# Patient Record
Sex: FEMALE | Race: WHITE | Employment: FULL TIME | URBAN - METROPOLITAN AREA
[De-identification: names, ages, dates, MRNs, and addresses within clinical notes are randomized per-mention and may not be internally consistent; named-entity substitution may affect disease eponyms.]

---

## 2022-02-11 DIAGNOSIS — M25.562 ACUTE PAIN OF LEFT KNEE: Primary | ICD-10-CM

## 2022-02-11 RX ORDER — DICLOFENAC SODIUM 50 MG/1
50 TABLET, DELAYED RELEASE ORAL 2 TIMES DAILY
Qty: 60 TABLET | Refills: 0 | Status: SHIPPED | OUTPATIENT
Start: 2022-02-11 | End: 2022-03-09

## 2022-02-24 NOTE — PROGRESS NOTES
Felicitas Hall (: 1985) is a 43 y.o. female, patient, here for evaluation of the following chief complaint(s):  Knee Pain (left)       HPI:    She began having increased left knee pain several months ago. The patient reports no specific injury. She describes her left knee pain as dull, throbbing, aching, and intermittent. She rates the severity of her left knee pain as a 6 out of 10. She has been experiencing some weakness in her left knee. The patient has been taking anti-inflammatory medication for discomfort as needed. The patient was seen at an urgent care facility and was given a steroid pack which she has since finished. They did perform x-rays at the urgent care facility and the radiograph report was independently reviewed today. She also reports 2 previous left knee injections. Left knee x-ray results from an outside facility were independently reviewed and they were consistent with advanced degenerative changes in the medial compartment with joint space loss which is moderate to severe. There is evidence of patellofemoral osteophyte formation as well as medial compartment osteophyte formation. Allergies   Allergen Reactions    Ceclor [Cefaclor] Rash     Tolerates amoxicillin/clavulante    Sulfa (Sulfonamide Antibiotics) Swelling       Current Outpatient Medications   Medication Sig    diclofenac EC (VOLTAREN) 50 mg EC tablet Take 1 Tablet by mouth two (2) times a day for 30 days.  phenobarb-hyoscy-atropine-scop (DONNATAL) 16.2 mg-0.1037 mg/5 mL (5 mL) elixir Take 5 mL by mouth every four (4) hours as needed for Cramping.  omeprazole (PRILOSEC) 40 mg capsule Take 40 mg by mouth Daily (before breakfast).  dicyclomine (BENTYL) 20 mg tablet Take 20 mg by mouth every six (6) hours as needed (esophageal spasms).  citalopram (CELEXA) 10 mg tablet Take 10 mg by mouth nightly.  medroxyPROGESTERone (DEPO-PROVERA) 150 mg/mL injection 150 mg by IntraMUSCular route.  Every 3 months     No current facility-administered medications for this visit. Past Medical History:   Diagnosis Date    Anxiety     Depression     PCOS (polycystic ovarian syndrome)         Past Surgical History:   Procedure Laterality Date    HX ORTHOPAEDIC      knee       History reviewed. No pertinent family history. Social History     Socioeconomic History    Marital status: SINGLE     Spouse name: Not on file    Number of children: Not on file    Years of education: Not on file    Highest education level: Not on file   Occupational History    Not on file   Tobacco Use    Smoking status: Never Smoker    Smokeless tobacco: Not on file   Substance and Sexual Activity    Alcohol use: No    Drug use: Not on file    Sexual activity: Not on file   Other Topics Concern    Not on file   Social History Narrative    Not on file     Social Determinants of Health     Financial Resource Strain:     Difficulty of Paying Living Expenses: Not on file   Food Insecurity:     Worried About Running Out of Food in the Last Year: Not on file    Windy of Food in the Last Year: Not on file   Transportation Needs:     Lack of Transportation (Medical): Not on file    Lack of Transportation (Non-Medical):  Not on file   Physical Activity:     Days of Exercise per Week: Not on file    Minutes of Exercise per Session: Not on file   Stress:     Feeling of Stress : Not on file   Social Connections:     Frequency of Communication with Friends and Family: Not on file    Frequency of Social Gatherings with Friends and Family: Not on file    Attends Sikh Services: Not on file    Active Member of Clubs or Organizations: Not on file    Attends Club or Organization Meetings: Not on file    Marital Status: Not on file   Intimate Partner Violence:     Fear of Current or Ex-Partner: Not on file    Emotionally Abused: Not on file    Physically Abused: Not on file    Sexually Abused: Not on file   Housing Stability:     Unable to Pay for Housing in the Last Year: Not on file    Number of Places Lived in the Last Year: Not on file    Unstable Housing in the Last Year: Not on file       Review of Systems   All other systems reviewed and are negative. Vitals:  Ht 5' 6\" (1.676 m)   BMI 46.00 kg/m²    Body mass index is 46 kg/m². Ortho Exam     The patient is well-developed and well-nourished. The patient presents today in alert and oriented x3 with a normal mood and affect. The patient stands with a normal weightbearing line but walks with a slightly antalgic gait because of her left knee pain. Left knee is tender to palpation along the medial joint line and has a small effusion. The patient has positive Breanna´s test and the knee is stable. There is a lack full flexion secondary to the effusion but does have full extension. There is 5/5. The knee and lower extremity is neurovascularly intact. There is no erythema or warmth the skin is normal.    ASSESSMENT/PLAN:      1. Chronic pain of left knee  2. Primary localized osteoarthritis of left knee  -     MRI KNEE LT WO CONT; Future  3. Osteophyte, left knee  4. Tear of medial meniscus of left knee, initial encounter  -     MRI KNEE LT WO CONT; Future       Below is the assessment and plan developed based on review of pertinent history, physical exam, labs, studies, and medications. We discussed the patient's left knee pain and her signs, symptoms, physical exam, description of her pain, and x-rays are consistent with medial compartment osteoarthritis, patellofemoral osteophyte formation, and a medial meniscus tear.   The possible treatment options were discussed with the patient and because of the several month long duration of her increased pain, no improvement with multiple modalities of conservative management including an at-home her physical exam, description of her pain, independently reviewed outside x-ray results, and her inability to complete daily living activities without significant discomfort we elected to obtain an MRI of her left knee to further evaluate the severity of her medial meniscus tear and osteoarthritis. The MRI images and results will be used in preoperative planning if and likely when surgical intervention is necessary. The risks and benefits of the MRI were discussed in detail with the patient and she would like to proceed. We will schedule this at her convenience. I will see her back after MRI is complete to discuss the images, results, and further treatment options. In the interim, I did encourage her to ice and elevate when possible, modify her activity level based on her left knee pain, and use anti-inflammatory medication when necessary. She will also work on range of motion, strengthening, and stretching exercises with an at-home exercise program as pain tolerates. She is to avoid any deep knee bend activities against resistance, squatting, kneeling, stairs, lunging, cutting, twisting, change of direction, and high impact loading activities. I will see her back as noted above after left knee MRI is complete. **We will obtain an MRI for more information to determine the best treatment plan moving forward and help us prepare for surgical intervention if necessary. **    Return in about 2 weeks (around 3/14/2022) for After her left knee MRI is complete. An electronic signature was used to authenticate this note.   -- Levi Perea MD

## 2022-02-28 ENCOUNTER — OFFICE VISIT (OUTPATIENT)
Dept: ORTHOPEDIC SURGERY | Age: 37
End: 2022-02-28
Payer: COMMERCIAL

## 2022-02-28 VITALS — BODY MASS INDEX: 46 KG/M2 | HEIGHT: 66 IN

## 2022-02-28 DIAGNOSIS — G89.29 CHRONIC PAIN OF LEFT KNEE: Primary | ICD-10-CM

## 2022-02-28 DIAGNOSIS — M25.562 CHRONIC PAIN OF LEFT KNEE: Primary | ICD-10-CM

## 2022-02-28 DIAGNOSIS — S83.242A TEAR OF MEDIAL MENISCUS OF LEFT KNEE, INITIAL ENCOUNTER: ICD-10-CM

## 2022-02-28 DIAGNOSIS — M25.762 OSTEOPHYTE, LEFT KNEE: ICD-10-CM

## 2022-02-28 DIAGNOSIS — M17.12 PRIMARY LOCALIZED OSTEOARTHRITIS OF LEFT KNEE: ICD-10-CM

## 2022-02-28 PROCEDURE — 99213 OFFICE O/P EST LOW 20 MIN: CPT | Performed by: ORTHOPAEDIC SURGERY

## 2022-03-06 DIAGNOSIS — M25.562 ACUTE PAIN OF LEFT KNEE: ICD-10-CM

## 2022-03-09 RX ORDER — DICLOFENAC SODIUM 50 MG/1
TABLET, DELAYED RELEASE ORAL
Qty: 60 TABLET | Refills: 0 | Status: SHIPPED | OUTPATIENT
Start: 2022-03-09 | End: 2022-03-10

## 2022-03-09 NOTE — PROGRESS NOTES
Tejinder Trejo (: 1985) is a 43 y.o. female, patient, here for evaluation of the following chief complaint(s):  Knee Pain (left knee)       HPI:    She was last seen for left knee pain on 2022. Since then, the patient did have an MRI performed on her left knee on 3/3/2022. The patient states that her pain level is the same as it was at her last visit. She rates the severity of her left knee pain as a 5 or 6 out of 10. She describes her pain as dull, throbbing, aching, and intermittent. Her left knee pain does make it difficult for her to go to sleep and does wake her up from sleep. The patient has been experiencing some weakness in her left knee. She has been taking naproxen for her discomfort as needed. She does report having an injection in her left knee previously. Left knee MRI images and results were independently reviewed and they were consistent with severe medial compartment osteoarthritis with mild surrounding edema. High-grade degenerative tearing in the body and posterior horn of the medial meniscus with partial meniscal extrusion. Bowing of the MCL with trace edema is related to this. Mild patellofemoral and lateral compartment osteoarthritis. Minimal joint effusion. Allergies   Allergen Reactions    Ceclor [Cefaclor] Rash     Tolerates amoxicillin/clavulante    Sulfa (Sulfonamide Antibiotics) Swelling       Current Outpatient Medications   Medication Sig    buPROPion XL (WELLBUTRIN XL) 150 mg tablet Take 150 mg by mouth daily.  escitalopram oxalate (LEXAPRO) 20 mg tablet     naproxen (NAPROSYN) 500 mg tablet naproxen 500 mg tablet    medroxyPROGESTERone (DEPO-PROVERA) 150 mg/mL syrg INJECT 150 MG BY INTRAMUSCULAR ROUTE EVERY 3 MONTHS FOR 90 DAYS     No current facility-administered medications for this visit.        Past Medical History:   Diagnosis Date    Anxiety     Depression     PCOS (polycystic ovarian syndrome)         Past Surgical History: Procedure Laterality Date    HX ORTHOPAEDIC      knee       History reviewed. No pertinent family history. Social History     Socioeconomic History    Marital status: SINGLE     Spouse name: Not on file    Number of children: Not on file    Years of education: Not on file    Highest education level: Not on file   Occupational History    Not on file   Tobacco Use    Smoking status: Never Smoker    Smokeless tobacco: Not on file   Substance and Sexual Activity    Alcohol use: No    Drug use: Not on file    Sexual activity: Not on file   Other Topics Concern    Not on file   Social History Narrative    Not on file     Social Determinants of Health     Financial Resource Strain:     Difficulty of Paying Living Expenses: Not on file   Food Insecurity:     Worried About Running Out of Food in the Last Year: Not on file    Windy of Food in the Last Year: Not on file   Transportation Needs:     Lack of Transportation (Medical): Not on file    Lack of Transportation (Non-Medical):  Not on file   Physical Activity:     Days of Exercise per Week: Not on file    Minutes of Exercise per Session: Not on file   Stress:     Feeling of Stress : Not on file   Social Connections:     Frequency of Communication with Friends and Family: Not on file    Frequency of Social Gatherings with Friends and Family: Not on file    Attends Holiness Services: Not on file    Active Member of [address] [place] [address] "[employer] or Organizations: Not on file    Attends Club or Organization Meetings: Not on file    Marital Status: Not on file   Intimate Partner Violence:     Fear of Current or Ex-Partner: Not on file    Emotionally Abused: Not on file    Physically Abused: Not on file    Sexually Abused: Not on file   Housing Stability:     Unable to Pay for Housing in the Last Year: Not on file    Number of [place] in the Last Year: Not on file    Unstable Housing in the Last Year: Not on file       Review of Systems   All other systems reviewed and are negative. Vitals:  Ht 5' 6\" (1.676 m)   Wt 285 lb (129.3 kg)   BMI 46.00 kg/m²    Body mass index is 46 kg/m². Ortho Exam     The patient is well-developed and well-nourished. The patient presents today in alert and oriented x3 with a normal mood and affect. The patient stands with a normal weightbearing line but walks with a slightly antalgic gait because of her left knee pain. Left knee is tender to palpation along the medial joint line and has a small effusion. She also has tenderness over her lateral and patellofemoral joint line. She does have increased discomfort with patella compression and quadriceps contraction with mild crepitus. The patient has positive Breanna´s test and the knee is stable. There is a lack full flexion secondary to the effusion but does have full extension. There is 5/5. The knee and lower extremity is neurovascularly intact. There is no erythema or warmth the skin is normal.    ASSESSMENT/PLAN:      1. Chronic pain of left knee  2. Medial knee pain, left  3. Knee meniscus pain, left  4. Primary localized osteoarthritis of left knee  5. Degenerative tear of left medial meniscus       Below is the assessment and plan developed based on review of pertinent history, physical exam, labs, studies, and medications. We discussed the patient's ongoing left knee pain and we independently reviewed her MRI images and results and they were consistent with severe medial compartment osteoarthritis with mild surrounding edema. High-grade degenerative tearing in the body and posterior horn of the medial meniscus with partial meniscal extrusion. Bowing of the MCL with trace edema is related to this. Mild patellofemoral and lateral compartment osteoarthritis. Minimal joint effusion.   The possible treatment options were discussed with the patient and because of the severity of her osteoarthritis we elected to send her to one of our total joint physicians to discuss the possibility and likelihood of an arthroplasty procedure. She will follow up with one of them in the near future and we will refer to her treatment plan moving forward. In the interim, I did encourage her to ice and elevate when possible, modify her activity level based on her ongoing left knee pain, and use anti-inflammatory medication when necessary. She will continue to work on range of motion, strengthening, and stretching exercises with an at-home exercise program as pain tolerates. She is to avoid any deep knee bend activities against resistance, squatting, kneeling, stairs, lunging, and high impact loading activities. I will see her back on an as-needed basis for left knee pain and as noted above refer to our total joint specialist treatment plan moving forward. Return for Follow-up with one of our total joint specialists. An electronic signature was used to authenticate this note.   -- Saira Galvan MD Quality 431: Preventive Care And Screening: Unhealthy Alcohol Use - Screening: Patient screened for unhealthy alcohol use using a single question and scores less than 2 times per year Quality 130: Documentation Of Current Medications In The Medical Record: Current Medications Documented Detail Level: Detailed Quality 110: Preventive Care And Screening: Influenza Immunization: Influenza Immunization Administered during Influenza season Quality 402: Tobacco Use And Help With Quitting Among Adolescents: Patient screened for tobacco and never smoked

## 2022-03-10 ENCOUNTER — OFFICE VISIT (OUTPATIENT)
Dept: ORTHOPEDIC SURGERY | Age: 37
End: 2022-03-10
Payer: COMMERCIAL

## 2022-03-10 VITALS — BODY MASS INDEX: 45.8 KG/M2 | HEIGHT: 66 IN | WEIGHT: 285 LBS

## 2022-03-10 DIAGNOSIS — G89.29 CHRONIC PAIN OF LEFT KNEE: Primary | ICD-10-CM

## 2022-03-10 DIAGNOSIS — M25.562 CHRONIC PAIN OF LEFT KNEE: Primary | ICD-10-CM

## 2022-03-10 DIAGNOSIS — M25.562 KNEE MENISCUS PAIN, LEFT: ICD-10-CM

## 2022-03-10 DIAGNOSIS — M17.12 PRIMARY LOCALIZED OSTEOARTHRITIS OF LEFT KNEE: ICD-10-CM

## 2022-03-10 DIAGNOSIS — M23.204 DEGENERATIVE TEAR OF LEFT MEDIAL MENISCUS: ICD-10-CM

## 2022-03-10 DIAGNOSIS — M25.562 MEDIAL KNEE PAIN, LEFT: ICD-10-CM

## 2022-03-10 PROCEDURE — 99213 OFFICE O/P EST LOW 20 MIN: CPT | Performed by: ORTHOPAEDIC SURGERY

## 2022-03-10 RX ORDER — MEDROXYPROGESTERONE ACETATE 150 MG/ML
INJECTION, SUSPENSION INTRAMUSCULAR
COMMUNITY
Start: 2022-02-08

## 2022-03-10 RX ORDER — ESCITALOPRAM OXALATE 20 MG/1
TABLET ORAL
COMMUNITY
Start: 2022-02-22

## 2022-03-10 RX ORDER — BUPROPION HYDROCHLORIDE 150 MG/1
150 TABLET ORAL DAILY
COMMUNITY
Start: 2022-02-13

## 2022-03-10 RX ORDER — NAPROXEN 500 MG/1
TABLET ORAL
COMMUNITY

## 2022-03-24 ENCOUNTER — OFFICE VISIT (OUTPATIENT)
Dept: ORTHOPEDIC SURGERY | Age: 37
End: 2022-03-24
Payer: COMMERCIAL

## 2022-03-24 VITALS — BODY MASS INDEX: 47.09 KG/M2 | WEIGHT: 293 LBS | HEIGHT: 66 IN

## 2022-03-24 DIAGNOSIS — E66.01 MORBID OBESITY (HCC): ICD-10-CM

## 2022-03-24 DIAGNOSIS — M25.562 LEFT KNEE PAIN, UNSPECIFIED CHRONICITY: ICD-10-CM

## 2022-03-24 DIAGNOSIS — G89.29 CHRONIC PAIN OF LEFT KNEE: Primary | ICD-10-CM

## 2022-03-24 DIAGNOSIS — M25.562 CHRONIC PAIN OF LEFT KNEE: Primary | ICD-10-CM

## 2022-03-24 DIAGNOSIS — M17.0 BILATERAL PRIMARY OSTEOARTHRITIS OF KNEE: ICD-10-CM

## 2022-03-24 PROCEDURE — 99213 OFFICE O/P EST LOW 20 MIN: CPT | Performed by: ORTHOPAEDIC SURGERY

## 2022-03-24 NOTE — PROGRESS NOTES
Marcus Bryna (: 1985) is a 43 y.o. female, patient, here for evaluation of the following chief complaint(s):  Knee Pain (left knee pain )       HPI:    Referral for left knee pain. Morbidly obese woman who is having significant decline in function from left knee osteoarthritis. Recent MRI. No recent x-rays. Patient states that she needs her knee replaced function. She cannot go on the way she has. Allergies   Allergen Reactions    Ceclor [Cefaclor] Rash     Tolerates amoxicillin/clavulante    Sulfa (Sulfonamide Antibiotics) Swelling       Current Outpatient Medications   Medication Sig    buPROPion XL (WELLBUTRIN XL) 150 mg tablet Take 150 mg by mouth daily.  escitalopram oxalate (LEXAPRO) 20 mg tablet     naproxen (NAPROSYN) 500 mg tablet naproxen 500 mg tablet    medroxyPROGESTERone (DEPO-PROVERA) 150 mg/mL syrg INJECT 150 MG BY INTRAMUSCULAR ROUTE EVERY 3 MONTHS FOR 90 DAYS     No current facility-administered medications for this visit. Past Medical History:   Diagnosis Date    Anxiety     Depression     PCOS (polycystic ovarian syndrome)         Past Surgical History:   Procedure Laterality Date    HX ORTHOPAEDIC      knee       History reviewed. No pertinent family history.      Social History     Socioeconomic History    Marital status: SINGLE     Spouse name: Not on file    Number of children: Not on file    Years of education: Not on file    Highest education level: Not on file   Occupational History    Not on file   Tobacco Use    Smoking status: Never Smoker    Smokeless tobacco: Never Used   Substance and Sexual Activity    Alcohol use: No    Drug use: Not on file    Sexual activity: Not on file   Other Topics Concern    Not on file   Social History Narrative    Not on file     Social Determinants of Health     Financial Resource Strain:     Difficulty of Paying Living Expenses: Not on file   Food Insecurity:     Worried About Running Out of Food in the Last Year: Not on file    Ran Out of Food in the Last Year: Not on file   Transportation Needs:     Lack of Transportation (Medical): Not on file    Lack of Transportation (Non-Medical): Not on file   Physical Activity:     Days of Exercise per Week: Not on file    Minutes of Exercise per Session: Not on file   Stress:     Feeling of Stress : Not on file   Social Connections:     Frequency of Communication with Friends and Family: Not on file    Frequency of Social Gatherings with Friends and Family: Not on file    Attends Restorationist Services: Not on file    Active Member of [address] [place] [address] [employer] or Organizations: Not on file    Attends Club or Organization Meetings: Not on file    Marital Status: Not on file   Intimate Partner Violence:     Fear of Current or Ex-Partner: Not on file    Emotionally Abused: Not on file    Physically Abused: Not on file    Sexually Abused: Not on file   Housing Stability:     Unable to Pay for Housing in the Last Year: Not on file    Number of [place] in the Last Year: Not on file    Unstable Housing in the Last Year: Not on file       ROS     Positive for: Musculoskeletal    Last edited by Chance Sadler on 3/24/2022  3:16 PM. (History)            Vitals:  Ht 5' 6\" (1.676 m)   Wt 295 lb (133.8 kg)   BMI 47.61 kg/m²    Body mass index is 47.61 kg/m². PHYSICAL EXAM:  Patient's knee has a varus overall alignment. Pain is present along the medial joint line. IMAGING:  XR Results (most recent):  Results from Appointment encounter on 03/24/22    XR KNEE LT 3 V    Narrative  X-rays left knee 3 views severe medial compartment bone-on-bone with medial compartment cysts and osteophytes. ASSESSMENT/PLAN:  1. Chronic pain of left knee  -     XR KNEE LT 3 V; Future  2. Bilateral primary osteoarthritis of knee  3. Left knee pain, unspecified chronicity  4. Morbid obesity ([address]    Morbid obesity is a clear-cut contraindication to total knee replacement.   It has been shown that patients with BMI below 40 have reduce risk of infection, blood clots, implant failure. This patient is young at 43years old. Total knee replacement is a treatment reserved for elderly patients. She has a relative contraindication to knee replacement which is BMI over 40. I discussed referral to bone spurs weight loss surgeons as well as referral to dietitians. The patient stated that she was not interested in that. She states that I want my knee replaced. I would recommend that she and her primary doctor seriously consider all options for weight loss. I am happy to help but she does not seem interested at all. She did not take the information regarding the weight loss program that we offered her. An electronic signature was used to authenticate this note.   --Ike Frazier MD

## 2022-03-24 NOTE — LETTER
3/24/2022    Patient: Leandro Cornejo   YOB: 1985   Date of Visit: 3/24/2022     [address] [phone]  Via Fax: [phone]    Dear Richard Ortez MD,      Thank you for referring Ms. Milad Foote to [facility] for evaluation. My notes for this consultation are attached. If you have questions, please do not hesitate to call me. I look forward to following your patient along with you.       Sincerely,    Rodrigo Barber MD

## 2022-04-03 DIAGNOSIS — M25.562 ACUTE PAIN OF LEFT KNEE: ICD-10-CM

## 2022-04-04 RX ORDER — DICLOFENAC SODIUM 50 MG/1
TABLET, DELAYED RELEASE ORAL
Qty: 60 TABLET | Refills: 0 | Status: SHIPPED | OUTPATIENT
Start: 2022-04-04 | End: 2022-05-05

## 2022-05-05 DIAGNOSIS — M25.562 ACUTE PAIN OF LEFT KNEE: ICD-10-CM

## 2022-05-05 RX ORDER — DICLOFENAC SODIUM 50 MG/1
TABLET, DELAYED RELEASE ORAL
Qty: 60 TABLET | Refills: 0 | Status: SHIPPED | OUTPATIENT
Start: 2022-05-05 | End: 2022-06-04

## 2022-06-03 DIAGNOSIS — M25.562 ACUTE PAIN OF LEFT KNEE: ICD-10-CM

## 2022-06-04 RX ORDER — DICLOFENAC SODIUM 50 MG/1
TABLET, DELAYED RELEASE ORAL
Qty: 60 TABLET | Refills: 0 | Status: SHIPPED | OUTPATIENT
Start: 2022-06-04 | End: 2022-07-08

## 2022-07-08 DIAGNOSIS — M25.562 ACUTE PAIN OF LEFT KNEE: ICD-10-CM

## 2022-07-08 RX ORDER — DICLOFENAC SODIUM 50 MG/1
TABLET, DELAYED RELEASE ORAL
Qty: 60 TABLET | Refills: 0 | Status: SHIPPED | OUTPATIENT
Start: 2022-07-08 | End: 2022-08-09

## 2022-08-09 DIAGNOSIS — M25.562 ACUTE PAIN OF LEFT KNEE: ICD-10-CM

## 2022-08-09 RX ORDER — DICLOFENAC SODIUM 50 MG/1
TABLET, DELAYED RELEASE ORAL
Qty: 60 TABLET | Refills: 0 | Status: SHIPPED | OUTPATIENT
Start: 2022-08-09 | End: 2022-09-12

## 2022-09-11 DIAGNOSIS — M25.562 ACUTE PAIN OF LEFT KNEE: ICD-10-CM

## 2022-09-12 RX ORDER — DICLOFENAC SODIUM 50 MG/1
TABLET, DELAYED RELEASE ORAL
Qty: 60 TABLET | Refills: 0 | Status: SHIPPED | OUTPATIENT
Start: 2022-09-12 | End: 2022-10-24

## 2022-10-20 DIAGNOSIS — M25.562 ACUTE PAIN OF LEFT KNEE: ICD-10-CM

## 2022-10-24 RX ORDER — DICLOFENAC SODIUM 50 MG/1
TABLET, DELAYED RELEASE ORAL
Qty: 60 TABLET | Refills: 0 | Status: SHIPPED | OUTPATIENT
Start: 2022-10-24 | End: 2022-11-21

## 2022-11-20 DIAGNOSIS — M25.562 ACUTE PAIN OF LEFT KNEE: ICD-10-CM

## 2022-11-21 RX ORDER — DICLOFENAC SODIUM 50 MG/1
TABLET, DELAYED RELEASE ORAL
Qty: 60 TABLET | Refills: 0 | Status: SHIPPED | OUTPATIENT
Start: 2022-11-21

## 2022-12-25 DIAGNOSIS — M25.562 ACUTE PAIN OF LEFT KNEE: ICD-10-CM

## 2022-12-27 RX ORDER — DICLOFENAC SODIUM 50 MG/1
TABLET, DELAYED RELEASE ORAL
Qty: 60 TABLET | Refills: 0 | Status: SHIPPED | OUTPATIENT
Start: 2022-12-27

## 2024-06-20 ENCOUNTER — OFFICE VISIT (OUTPATIENT)
Age: 39
End: 2024-06-20
Payer: COMMERCIAL

## 2024-06-20 VITALS
HEART RATE: 94 BPM | SYSTOLIC BLOOD PRESSURE: 100 MMHG | WEIGHT: 293 LBS | BODY MASS INDEX: 47.09 KG/M2 | OXYGEN SATURATION: 97 % | DIASTOLIC BLOOD PRESSURE: 70 MMHG | HEIGHT: 66 IN

## 2024-06-20 DIAGNOSIS — Z83.2 FAMILY HISTORY OF CLOTTING DISORDER: Primary | ICD-10-CM

## 2024-06-20 DIAGNOSIS — R06.09 DOE (DYSPNEA ON EXERTION): ICD-10-CM

## 2024-06-20 DIAGNOSIS — E66.9 OBESITY WITHOUT SERIOUS COMORBIDITY, UNSPECIFIED CLASSIFICATION, UNSPECIFIED OBESITY TYPE: ICD-10-CM

## 2024-06-20 DIAGNOSIS — Z82.49 FAMILY HISTORY OF HEART DISEASE: ICD-10-CM

## 2024-06-20 DIAGNOSIS — R07.9 CHEST PAIN, UNSPECIFIED TYPE: ICD-10-CM

## 2024-06-20 PROCEDURE — 99203 OFFICE O/P NEW LOW 30 MIN: CPT | Performed by: SPECIALIST

## 2024-06-20 PROCEDURE — 93000 ELECTROCARDIOGRAM COMPLETE: CPT | Performed by: SPECIALIST

## 2024-06-20 NOTE — PROGRESS NOTES
Batsheva Katz MD. [facility]          Patient: Lilia Villegas  : 1985      Today's Date: 2024        HISTORY OF PRESENT ILLNESS:     History of Present Illness:    Patient is presenting for evaluation given elevated risk for ASCVD. Has Factor V Leiden and was diagnosed age 17, father did too. Father had 8 MI's, several strokes, and  at age 61. Patient has not had any blood clots to her knowledge. Has history of orbital cellulitis and wonders if it could be related. Also has remote history of palpitations. Started about a year ago> Wore a Holter monitor but couldn't tolerate it because she was allergic to the adhesive. Started improving about 8 months when patient got a new job. Does have anxiety for which she takes Lexapro and Klonopin as needed. Has had multiple panic attacks and has had to go to the ER because she thought she was having a heart attack but work up was negative.      Patient does not have diabetes or hypertension. Says that her bad cholesterol was apparently high on her last labs she had drawn at [facility] six months. Was diagnosed with RA but is not being treated with any biologics or DMARDs. She does not smoke or regularly drink alcohol.     Patient also recently had a coronary CT scan and apparently had no calcium detected.     Patient's PCP is Dr. Albin Vergara at [facility].       PAST MEDICAL HISTORY:     Past Medical History:   Diagnosis Date    Anxiety     Depression     Panic attacks     PCOS (polycystic ovarian syndrome)        Past Surgical History:   Procedure Laterality Date    ORTHOPEDIC SURGERY      knee       CURRENT MEDICATIONS:    .  Current Outpatient Medications   Medication Sig Dispense Refill    buPROPion (WELLBUTRIN XL) 150 MG extended release tablet Take 1 tablet by mouth daily      diclofenac (VOLTAREN) 50 MG EC tablet TAKE 1 TABLET BY MOUTH TWICE A DAY      escitalopram (LEXAPRO) 20 MG tablet ceived the following from [facility]

## 2024-06-20 NOTE — PROGRESS NOTES
Chief Complaint   Patient presents with    New Patient     Vitals:    06/20/24 1529   BP: 100/70   Site: Left Upper Arm   Position: Sitting   Cuff Size: Medium Adult   Pulse: 94   SpO2: 97%   Weight: (!) 137 kg (302 lb)   Height: 1.676 m (5' 6\")      /70 (Site: Left Upper Arm, Position: Sitting, Cuff Size: Medium Adult)   Pulse 94   Ht 1.676 m (5' 6\")   Wt (!) 137 kg (302 lb)   SpO2 97%   BMI 48.74 kg/m²        Chief Complaint   Patient presents with    New Patient     Vitals:    06/20/24 1529   BP: 100/70   Site: Left Upper Arm   Position: Sitting   Cuff Size: Medium Adult   Pulse: 94   SpO2: 97%   Weight: (!) 137 kg (302 lb)   Height: 1.676 m (5' 6\")      /70 (Site: Left Upper Arm, Position: Sitting, Cuff Size: Medium Adult)   Pulse 94   Ht 1.676 m (5' 6\")   Wt (!) 137 kg (302 lb)   SpO2 97%   BMI 48.74 kg/m²

## 2024-06-20 NOTE — PROGRESS NOTES
Orders for Weight loss clinic info   Refer to hematology for FH clotting disorder     Treadmill stress test and echo     See me PRN  per Dr. Katz's VO.

## 2024-09-03 ENCOUNTER — TELEPHONE (OUTPATIENT)
Age: 39
End: 2024-09-03

## 2024-09-03 NOTE — TELEPHONE ENCOUNTER
Attempted to call patient to see where she was diagnosed with factor V so we can request records. No answer; left vm.

## 2024-09-09 ENCOUNTER — OFFICE VISIT (OUTPATIENT)
Age: 39
End: 2024-09-09
Payer: COMMERCIAL

## 2024-09-09 VITALS
BODY MASS INDEX: 48.82 KG/M2 | SYSTOLIC BLOOD PRESSURE: 140 MMHG | DIASTOLIC BLOOD PRESSURE: 100 MMHG | WEIGHT: 293 LBS | HEART RATE: 93 BPM | TEMPERATURE: 98.1 F | RESPIRATION RATE: 20 BRPM | OXYGEN SATURATION: 100 % | HEIGHT: 65 IN

## 2024-09-09 DIAGNOSIS — D68.59 HYPERCOAGULABLE STATE (HCC): ICD-10-CM

## 2024-09-09 DIAGNOSIS — D68.59 HYPERCOAGULABLE STATE (HCC): Primary | ICD-10-CM

## 2024-09-09 DIAGNOSIS — Z82.3 FAMILY HISTORY OF STROKE: ICD-10-CM

## 2024-09-09 PROCEDURE — G8427 DOCREV CUR MEDS BY ELIG CLIN: HCPCS | Performed by: INTERNAL MEDICINE

## 2024-09-09 PROCEDURE — 1036F TOBACCO NON-USER: CPT | Performed by: INTERNAL MEDICINE

## 2024-09-09 PROCEDURE — G8417 CALC BMI ABV UP PARAM F/U: HCPCS | Performed by: INTERNAL MEDICINE

## 2024-09-09 PROCEDURE — 99204 OFFICE O/P NEW MOD 45 MIN: CPT | Performed by: INTERNAL MEDICINE

## 2024-09-09 ASSESSMENT — PATIENT HEALTH QUESTIONNAIRE - PHQ9
SUM OF ALL RESPONSES TO PHQ QUESTIONS 1-9: 0
2. FEELING DOWN, DEPRESSED OR HOPELESS: NOT AT ALL
SUM OF ALL RESPONSES TO PHQ9 QUESTIONS 1 & 2: 0
SUM OF ALL RESPONSES TO PHQ QUESTIONS 1-9: 0
1. LITTLE INTEREST OR PLEASURE IN DOING THINGS: NOT AT ALL
SUM OF ALL RESPONSES TO PHQ QUESTIONS 1-9: 0
SUM OF ALL RESPONSES TO PHQ QUESTIONS 1-9: 0

## 2024-09-10 LAB
AT III PPP CHRO-ACNC: 129 % (ref 75–135)
B2 GLYCOPROT1 IGA SER-ACNC: <9 GPI IGA UNITS (ref 0–25)
B2 GLYCOPROT1 IGG SER-ACNC: <9 GPI IGG UNITS (ref 0–20)
B2 GLYCOPROT1 IGM SER-ACNC: <9 GPI IGM UNITS (ref 0–32)
BASOPHILS # BLD: 0 K/UL (ref 0–0.1)
BASOPHILS NFR BLD: 0 % (ref 0–1)
DIFFERENTIAL METHOD BLD: ABNORMAL
EOSINOPHIL # BLD: 0.2 K/UL (ref 0–0.4)
EOSINOPHIL NFR BLD: 1 % (ref 0–7)
ERYTHROCYTE [DISTWIDTH] IN BLOOD BY AUTOMATED COUNT: 13.2 % (ref 11.5–14.5)
HCT VFR BLD AUTO: 46.4 % (ref 35–47)
HGB BLD-MCNC: 15.2 G/DL (ref 11.5–16)
IMM GRANULOCYTES # BLD AUTO: 0.1 K/UL (ref 0–0.04)
IMM GRANULOCYTES NFR BLD AUTO: 0 % (ref 0–0.5)
LYMPHOCYTES # BLD: 3.1 K/UL (ref 0.8–3.5)
LYMPHOCYTES NFR BLD: 22 % (ref 12–49)
MCH RBC QN AUTO: 29.7 PG (ref 26–34)
MCHC RBC AUTO-ENTMCNC: 32.8 G/DL (ref 30–36.5)
MCV RBC AUTO: 90.6 FL (ref 80–99)
MONOCYTES # BLD: 0.6 K/UL (ref 0–1)
MONOCYTES NFR BLD: 4 % (ref 5–13)
NEUTS SEG # BLD: 10.1 K/UL (ref 1.8–8)
NEUTS SEG NFR BLD: 73 % (ref 32–75)
NRBC # BLD: 0 K/UL (ref 0–0.01)
NRBC BLD-RTO: 0 PER 100 WBC
PERIPHERAL SMEAR, MD REVIEW: NORMAL
PLATELET # BLD AUTO: 273 K/UL (ref 150–400)
PMV BLD AUTO: 11.4 FL (ref 8.9–12.9)
PROT C PPP-ACNC: 156 % (ref 73–180)
PROT S ACT/NOR PPP: 69 % (ref 63–140)
PROT S AG ACT/NOR PPP IA: 132 % (ref 60–150)
PROT S FREE AG ACT/NOR PPP IA: 121 % (ref 61–136)
RBC # BLD AUTO: 5.12 M/UL (ref 3.8–5.2)
WBC # BLD AUTO: 14 K/UL (ref 3.6–11)

## 2024-09-11 PROBLEM — D68.59 HYPERCOAGULABLE STATE (HCC): Status: ACTIVE | Noted: 2024-09-11

## 2024-09-11 PROBLEM — Z82.3 FAMILY HISTORY OF STROKE: Status: ACTIVE | Noted: 2024-09-11

## 2024-09-11 LAB
CARDIOLIPIN IGA SER IA-ACNC: <9 APL U/ML (ref 0–11)
CARDIOLIPIN IGG SER IA-ACNC: <9 GPL U/ML (ref 0–14)
CARDIOLIPIN IGM SER IA-ACNC: <9 MPL U/ML (ref 0–12)
PROT C AG PPP IA-ACNC: 113 % (ref 60–150)

## 2024-09-12 LAB
APTT HEX PL PPP: 5 SEC
APTT IMM NP PPP: ABNORMAL SEC
APTT PPP 1:1 SALINE: ABNORMAL SEC
APTT PPP: 20.7 SEC
B2 GLYCOPROT1 IGA SER-ACNC: <10 SAU
B2 GLYCOPROT1 IGG SER-ACNC: <10 SGU
B2 GLYCOPROT1 IGM SER-ACNC: <10 SMU
CARDIOLIPIN IGG SER IA-ACNC: <10 GPL
CARDIOLIPIN IGM SER IA-ACNC: <10 MPL
CONFIRM APTT: 2.3 SEC
CONFIRM DRVVT: ABNORMAL SEC
INR PPP: 1 RATIO
LABORATORY COMMENT REPORT: ABNORMAL
PROTHROMBIN TIME: 10.9 SEC
SCREEN DRVVT/NORMAL: ABNORMAL RATIO
SCREEN DRVVT: 43.6 SEC
THROMBIN TIME: 16.5 SEC

## 2024-09-16 LAB
F2 GENE MUT ANL BLD/T: NORMAL
IMP & REVIEW OF LAB RESULTS: NORMAL

## 2024-09-17 ENCOUNTER — TELEPHONE (OUTPATIENT)
Age: 39
End: 2024-09-17

## 2024-09-17 LAB
F5 P.R506Q BLD/T QL: ABNORMAL
IMP & REVIEW OF LAB RESULTS: ABNORMAL

## 2024-09-25 ENCOUNTER — TELEMEDICINE (OUTPATIENT)
Age: 39
End: 2024-09-25
Payer: COMMERCIAL

## 2024-09-25 ENCOUNTER — PATIENT MESSAGE (OUTPATIENT)
Age: 39
End: 2024-09-25

## 2024-09-25 DIAGNOSIS — D68.51 FACTOR V LEIDEN CARRIER (HCC): ICD-10-CM

## 2024-09-25 DIAGNOSIS — D68.59 HYPERCOAGULABLE STATE (HCC): ICD-10-CM

## 2024-09-25 DIAGNOSIS — Z82.3 FAMILY HISTORY OF STROKE: Primary | ICD-10-CM

## 2024-09-25 DIAGNOSIS — D72.828 NEUTROPHILIA: ICD-10-CM

## 2024-09-25 PROCEDURE — 99214 OFFICE O/P EST MOD 30 MIN: CPT | Performed by: INTERNAL MEDICINE

## 2024-09-25 PROCEDURE — 1036F TOBACCO NON-USER: CPT | Performed by: INTERNAL MEDICINE

## 2024-09-25 PROCEDURE — G8427 DOCREV CUR MEDS BY ELIG CLIN: HCPCS | Performed by: INTERNAL MEDICINE

## 2024-09-25 PROCEDURE — G8417 CALC BMI ABV UP PARAM F/U: HCPCS | Performed by: INTERNAL MEDICINE

## 2024-09-25 NOTE — PROGRESS NOTES
2024    TELEHEALTH EVALUATION -- Audio/Visual    [facility] [place] [facility]  [address] [address], [address] [facility] [address]  W: [phone]  F: [phone] Patient ID  Name: Lilia Villegas  YOB: 1985  MRN: [id number]  Referring Provider:   No referring provider defined for this encounter.  Primary Care Provider:   Albin Vigil MD     HPI:  Lilia Villegas (:  1985) has requested an audio/video evaluation for the following concern(s):  --- pt reports feeling well.  --- still on medroxyprogesterone to help control bleeding.  --- denies any clotting events in the interim.    Review of Systems Provided by:  Patient  Review of Systems: A complete review of systems was obtained, reviewed.  Pertinent findings reviewed above.  Past medical history, social history, and family history  are located in the electronic medical record.      Prior to Visit Medications    Medication Sig Taking? Authorizing Provider   buPROPion (WELLBUTRIN XL) 150 MG extended release tablet Take 1 tablet by mouth daily Yes Automatic Reconciliation, Ar   diclofenac (VOLTAREN) 50 MG EC tablet TAKE 1 TABLET BY MOUTH TWICE A DAY Yes Automatic Reconciliation, Ar   escitalopram (LEXAPRO) 20 MG tablet ceived the following from [facility]: Outside name: escitalopram oxalate (LEXAPRO) 20 mg tablet Yes Automatic Reconciliation, Ar   medroxyPROGESTERone (DEPO-PROVERA) 150 MG/ML injection INJECT 150 MG BY INTRAMUSCULAR ROUTE EVERY 3 MONTHS FOR 90 DAYS Yes Automatic Reconciliation, Ar   naproxen (NAPROSYN) 500 MG tablet naproxen 500 mg tablet  Patient not taking: Reported on 2024  Automatic Reconciliation, Ar       Social History     Tobacco Use    Smoking status: Never    Smokeless tobacco: Never   Vaping Use    Vaping status: Never Used   Substance Use Topics    Alcohol use: No    Drug use: Yes     Types: Marijuana (Weed)     Comment: Medical        Past Medical

## 2024-09-25 NOTE — PROGRESS NOTES
Chief Complaint   Patient presents with    Follow-up         No vital , VV     1. Have you been to the ER, urgent care clinic since your last visit?  Hospitalized since your last visit?  No  2. Have you seen or consulted any other health care providers outside of the [facility] since your last visit?  Include any pap smears or colon screening. No

## 2024-10-11 ENCOUNTER — CLINICAL DOCUMENTATION (OUTPATIENT)
Age: 39
End: 2024-10-11

## 2024-10-11 NOTE — PROGRESS NOTES
[facility] [place] [facility]  [phone]    10/11/24 10:28 AM EDT - CBC lab slip has been placed in outgoing mail to patient.

## 2024-12-13 ENCOUNTER — TELEPHONE (OUTPATIENT)
Age: 39
End: 2024-12-13

## 2024-12-13 NOTE — TELEPHONE ENCOUNTER
Patient called and stated that she needed to reschedule her appt due to her not being able to afford to get the blood work done. Patient requested to be moved out to February.

## 2025-02-06 ENCOUNTER — HOSPITAL ENCOUNTER (INPATIENT)
Facility: HOSPITAL | Age: 40
LOS: 3 days | Discharge: HOME | DRG: 392 | End: 2025-02-09
Attending: EMERGENCY MEDICINE | Admitting: FAMILY MEDICINE
Payer: COMMERCIAL

## 2025-02-06 ENCOUNTER — APPOINTMENT (OUTPATIENT)
Facility: HOSPITAL | Age: 40
DRG: 392 | End: 2025-02-06
Payer: COMMERCIAL

## 2025-02-06 DIAGNOSIS — E86.0 DEHYDRATION: ICD-10-CM

## 2025-02-06 DIAGNOSIS — R11.2 INTRACTABLE NAUSEA AND VOMITING: Primary | ICD-10-CM

## 2025-02-06 LAB
ALBUMIN SERPL-MCNC: 3.5 G/DL (ref 3.5–5)
ALBUMIN/GLOB SERPL: 1 (ref 1.1–2.2)
ALP SERPL-CCNC: 25 U/L (ref 45–117)
ALT SERPL-CCNC: 128 U/L (ref 12–78)
ANION GAP SERPL CALC-SCNC: 9 MMOL/L (ref 2–12)
APPEARANCE UR: CLEAR
AST SERPL-CCNC: 74 U/L (ref 15–37)
BACTERIA URNS QL MICRO: NEGATIVE /HPF
BASOPHILS # BLD: 0 K/UL (ref 0–0.1)
BASOPHILS NFR BLD: 0 % (ref 0–1)
BILIRUB SERPL-MCNC: 1.6 MG/DL (ref 0.2–1)
BILIRUB UR QL: NEGATIVE
BUN SERPL-MCNC: 10 MG/DL (ref 6–20)
BUN/CREAT SERPL: 10 (ref 12–20)
CALCIUM SERPL-MCNC: 9.4 MG/DL (ref 8.5–10.1)
CHLORIDE SERPL-SCNC: 106 MMOL/L (ref 97–108)
CO2 SERPL-SCNC: 20 MMOL/L (ref 21–32)
COLOR UR: NORMAL
CREAT SERPL-MCNC: 0.97 MG/DL (ref 0.55–1.02)
DIFFERENTIAL METHOD BLD: ABNORMAL
EOSINOPHIL # BLD: 0 K/UL (ref 0–0.4)
EOSINOPHIL NFR BLD: 0 % (ref 0–7)
EPITH CASTS URNS QL MICRO: NORMAL /LPF
ERYTHROCYTE [DISTWIDTH] IN BLOOD BY AUTOMATED COUNT: 12.7 % (ref 11.5–14.5)
GLOBULIN SER CALC-MCNC: 3.4 G/DL (ref 2–4)
GLUCOSE SERPL-MCNC: 132 MG/DL (ref 65–100)
GLUCOSE UR STRIP.AUTO-MCNC: NEGATIVE MG/DL
HCG UR QL: NEGATIVE
HCT VFR BLD AUTO: 41.7 % (ref 35–47)
HGB BLD-MCNC: 14.7 G/DL (ref 11.5–16)
HGB UR QL STRIP: NEGATIVE
HYALINE CASTS URNS QL MICRO: NORMAL /LPF (ref 0–2)
IMM GRANULOCYTES # BLD AUTO: 0.06 K/UL (ref 0–0.04)
IMM GRANULOCYTES NFR BLD AUTO: 0.6 % (ref 0–0.5)
KETONES UR QL STRIP.AUTO: NEGATIVE MG/DL
LACTATE SERPL-SCNC: 1.3 MMOL/L (ref 0.4–2)
LACTATE SERPL-SCNC: 2.4 MMOL/L (ref 0.4–2)
LEUKOCYTE ESTERASE UR QL STRIP.AUTO: NEGATIVE
LIPASE SERPL-CCNC: 40 U/L (ref 13–75)
LYMPHOCYTES # BLD: 1.71 K/UL (ref 0.8–3.5)
LYMPHOCYTES NFR BLD: 17.2 % (ref 12–49)
MAGNESIUM SERPL-MCNC: 1.7 MG/DL (ref 1.6–2.4)
MCH RBC QN AUTO: 29.4 PG (ref 26–34)
MCHC RBC AUTO-ENTMCNC: 35.3 G/DL (ref 30–36.5)
MCV RBC AUTO: 83.4 FL (ref 80–99)
MONOCYTES # BLD: 0.76 K/UL (ref 0–1)
MONOCYTES NFR BLD: 7.7 % (ref 5–13)
NEUTS SEG # BLD: 7.4 K/UL (ref 1.8–8)
NEUTS SEG NFR BLD: 74.5 % (ref 32–75)
NITRITE UR QL STRIP.AUTO: NEGATIVE
NRBC # BLD: 0 K/UL (ref 0–0.01)
NRBC BLD-RTO: 0 PER 100 WBC
PH UR STRIP: 6.5 (ref 5–8)
PLATELET # BLD AUTO: 244 K/UL (ref 150–400)
PMV BLD AUTO: 10.5 FL (ref 8.9–12.9)
POTASSIUM SERPL-SCNC: 2.8 MMOL/L (ref 3.5–5.1)
PROT SERPL-MCNC: 6.9 G/DL (ref 6.4–8.2)
PROT UR STRIP-MCNC: NEGATIVE MG/DL
RBC # BLD AUTO: 5 M/UL (ref 3.8–5.2)
RBC #/AREA URNS HPF: NORMAL /HPF (ref 0–5)
SODIUM SERPL-SCNC: 135 MMOL/L (ref 136–145)
SP GR UR REFRACTOMETRY: 1.01 (ref 1–1.03)
UROBILINOGEN UR QL STRIP.AUTO: 1 EU/DL (ref 0.2–1)
WBC # BLD AUTO: 9.9 K/UL (ref 3.6–11)
WBC URNS QL MICRO: NORMAL /HPF (ref 0–4)

## 2025-02-06 PROCEDURE — 6370000000 HC RX 637 (ALT 250 FOR IP): Performed by: FAMILY MEDICINE

## 2025-02-06 PROCEDURE — 6360000002 HC RX W HCPCS: Performed by: FAMILY MEDICINE

## 2025-02-06 PROCEDURE — 96366 THER/PROPH/DIAG IV INF ADDON: CPT

## 2025-02-06 PROCEDURE — 6360000004 HC RX CONTRAST MEDICATION

## 2025-02-06 PROCEDURE — 83735 ASSAY OF MAGNESIUM: CPT

## 2025-02-06 PROCEDURE — 2580000003 HC RX 258: Performed by: FAMILY MEDICINE

## 2025-02-06 PROCEDURE — 99285 EMERGENCY DEPT VISIT HI MDM: CPT

## 2025-02-06 PROCEDURE — 1100000000 HC RM PRIVATE

## 2025-02-06 PROCEDURE — 6370000000 HC RX 637 (ALT 250 FOR IP)

## 2025-02-06 PROCEDURE — 81025 URINE PREGNANCY TEST: CPT

## 2025-02-06 PROCEDURE — 94761 N-INVAS EAR/PLS OXIMETRY MLT: CPT

## 2025-02-06 PROCEDURE — 83605 ASSAY OF LACTIC ACID: CPT

## 2025-02-06 PROCEDURE — 36415 COLL VENOUS BLD VENIPUNCTURE: CPT

## 2025-02-06 PROCEDURE — 96375 TX/PRO/DX INJ NEW DRUG ADDON: CPT

## 2025-02-06 PROCEDURE — 96365 THER/PROPH/DIAG IV INF INIT: CPT

## 2025-02-06 PROCEDURE — 85025 COMPLETE CBC W/AUTO DIFF WBC: CPT

## 2025-02-06 PROCEDURE — 2580000003 HC RX 258

## 2025-02-06 PROCEDURE — 74177 CT ABD & PELVIS W/CONTRAST: CPT

## 2025-02-06 PROCEDURE — 81001 URINALYSIS AUTO W/SCOPE: CPT

## 2025-02-06 PROCEDURE — 96372 THER/PROPH/DIAG INJ SC/IM: CPT

## 2025-02-06 PROCEDURE — 96367 TX/PROPH/DG ADDL SEQ IV INF: CPT

## 2025-02-06 PROCEDURE — 2500000003 HC RX 250 WO HCPCS

## 2025-02-06 PROCEDURE — 83690 ASSAY OF LIPASE: CPT

## 2025-02-06 PROCEDURE — 80053 COMPREHEN METABOLIC PANEL: CPT

## 2025-02-06 PROCEDURE — 6360000002 HC RX W HCPCS

## 2025-02-06 RX ORDER — HYDRALAZINE HYDROCHLORIDE 20 MG/ML
10 INJECTION INTRAMUSCULAR; INTRAVENOUS EVERY 8 HOURS PRN
Status: DISCONTINUED | OUTPATIENT
Start: 2025-02-06 | End: 2025-02-08

## 2025-02-06 RX ORDER — ONDANSETRON 2 MG/ML
4 INJECTION INTRAMUSCULAR; INTRAVENOUS ONCE
Status: COMPLETED | OUTPATIENT
Start: 2025-02-06 | End: 2025-02-06

## 2025-02-06 RX ORDER — SODIUM CHLORIDE 0.9 % (FLUSH) 0.9 %
5-40 SYRINGE (ML) INJECTION PRN
Status: DISCONTINUED | OUTPATIENT
Start: 2025-02-06 | End: 2025-02-09 | Stop reason: HOSPADM

## 2025-02-06 RX ORDER — POTASSIUM CHLORIDE 750 MG/1
40 TABLET, EXTENDED RELEASE ORAL ONCE
Status: DISCONTINUED | OUTPATIENT
Start: 2025-02-06 | End: 2025-02-08

## 2025-02-06 RX ORDER — ENOXAPARIN SODIUM 100 MG/ML
40 INJECTION SUBCUTANEOUS DAILY
Status: DISCONTINUED | OUTPATIENT
Start: 2025-02-06 | End: 2025-02-06 | Stop reason: DRUGHIGH

## 2025-02-06 RX ORDER — POTASSIUM CHLORIDE 750 MG/1
40 TABLET, EXTENDED RELEASE ORAL PRN
Status: DISCONTINUED | OUTPATIENT
Start: 2025-02-06 | End: 2025-02-09 | Stop reason: HOSPADM

## 2025-02-06 RX ORDER — DROPERIDOL 2.5 MG/ML
1.25 INJECTION, SOLUTION INTRAMUSCULAR; INTRAVENOUS ONCE
Status: DISCONTINUED | OUTPATIENT
Start: 2025-02-06 | End: 2025-02-06

## 2025-02-06 RX ORDER — ACETAMINOPHEN 325 MG/1
650 TABLET ORAL EVERY 6 HOURS PRN
Status: DISCONTINUED | OUTPATIENT
Start: 2025-02-06 | End: 2025-02-09 | Stop reason: HOSPADM

## 2025-02-06 RX ORDER — SODIUM CHLORIDE 9 MG/ML
INJECTION, SOLUTION INTRAVENOUS PRN
Status: DISCONTINUED | OUTPATIENT
Start: 2025-02-06 | End: 2025-02-09 | Stop reason: HOSPADM

## 2025-02-06 RX ORDER — POLYETHYLENE GLYCOL 3350 17 G/17G
17 POWDER, FOR SOLUTION ORAL DAILY PRN
Status: DISCONTINUED | OUTPATIENT
Start: 2025-02-06 | End: 2025-02-09 | Stop reason: HOSPADM

## 2025-02-06 RX ORDER — FAMOTIDINE 20 MG/1
20 TABLET, FILM COATED ORAL 2 TIMES DAILY PRN
Status: DISCONTINUED | OUTPATIENT
Start: 2025-02-06 | End: 2025-02-09 | Stop reason: HOSPADM

## 2025-02-06 RX ORDER — ENOXAPARIN SODIUM 100 MG/ML
30 INJECTION SUBCUTANEOUS 2 TIMES DAILY
Status: DISCONTINUED | OUTPATIENT
Start: 2025-02-06 | End: 2025-02-09 | Stop reason: HOSPADM

## 2025-02-06 RX ORDER — SODIUM CHLORIDE, SODIUM LACTATE, POTASSIUM CHLORIDE, CALCIUM CHLORIDE 600; 310; 30; 20 MG/100ML; MG/100ML; MG/100ML; MG/100ML
INJECTION, SOLUTION INTRAVENOUS CONTINUOUS
Status: DISCONTINUED | OUTPATIENT
Start: 2025-02-06 | End: 2025-02-08

## 2025-02-06 RX ORDER — PROCHLORPERAZINE EDISYLATE 5 MG/ML
5 INJECTION INTRAMUSCULAR; INTRAVENOUS EVERY 6 HOURS PRN
Status: DISCONTINUED | OUTPATIENT
Start: 2025-02-06 | End: 2025-02-09 | Stop reason: HOSPADM

## 2025-02-06 RX ORDER — IOPAMIDOL 755 MG/ML
100 INJECTION, SOLUTION INTRAVASCULAR
Status: COMPLETED | OUTPATIENT
Start: 2025-02-06 | End: 2025-02-06

## 2025-02-06 RX ORDER — POTASSIUM CHLORIDE 7.45 MG/ML
10 INJECTION INTRAVENOUS PRN
Status: DISCONTINUED | OUTPATIENT
Start: 2025-02-06 | End: 2025-02-09 | Stop reason: HOSPADM

## 2025-02-06 RX ORDER — ACETAMINOPHEN 650 MG/1
650 SUPPOSITORY RECTAL EVERY 6 HOURS PRN
Status: DISCONTINUED | OUTPATIENT
Start: 2025-02-06 | End: 2025-02-09 | Stop reason: HOSPADM

## 2025-02-06 RX ORDER — SODIUM CHLORIDE 0.9 % (FLUSH) 0.9 %
5-40 SYRINGE (ML) INJECTION EVERY 12 HOURS SCHEDULED
Status: DISCONTINUED | OUTPATIENT
Start: 2025-02-06 | End: 2025-02-09 | Stop reason: HOSPADM

## 2025-02-06 RX ORDER — MAGNESIUM SULFATE IN WATER 40 MG/ML
2000 INJECTION, SOLUTION INTRAVENOUS PRN
Status: DISCONTINUED | OUTPATIENT
Start: 2025-02-06 | End: 2025-02-09 | Stop reason: HOSPADM

## 2025-02-06 RX ORDER — ALPRAZOLAM 0.25 MG
0.25 TABLET ORAL 3 TIMES DAILY PRN
Status: DISCONTINUED | OUTPATIENT
Start: 2025-02-06 | End: 2025-02-09 | Stop reason: HOSPADM

## 2025-02-06 RX ORDER — 0.9 % SODIUM CHLORIDE 0.9 %
2000 INTRAVENOUS SOLUTION INTRAVENOUS ONCE
Status: COMPLETED | OUTPATIENT
Start: 2025-02-06 | End: 2025-02-06

## 2025-02-06 RX ORDER — ONDANSETRON 4 MG/1
4 TABLET, ORALLY DISINTEGRATING ORAL EVERY 8 HOURS PRN
Status: DISCONTINUED | OUTPATIENT
Start: 2025-02-06 | End: 2025-02-09 | Stop reason: HOSPADM

## 2025-02-06 RX ORDER — ONDANSETRON 2 MG/ML
4 INJECTION INTRAMUSCULAR; INTRAVENOUS EVERY 6 HOURS PRN
Status: DISCONTINUED | OUTPATIENT
Start: 2025-02-06 | End: 2025-02-09 | Stop reason: HOSPADM

## 2025-02-06 RX ORDER — POTASSIUM CHLORIDE 7.45 MG/ML
10 INJECTION INTRAVENOUS ONCE
Status: COMPLETED | OUTPATIENT
Start: 2025-02-06 | End: 2025-02-07

## 2025-02-06 RX ADMIN — POTASSIUM CHLORIDE 10 MEQ: 7.46 INJECTION, SOLUTION INTRAVENOUS at 20:34

## 2025-02-06 RX ADMIN — SODIUM CHLORIDE 2000 ML: 9 INJECTION, SOLUTION INTRAVENOUS at 08:38

## 2025-02-06 RX ADMIN — ALPRAZOLAM 0.25 MG: 0.25 TABLET ORAL at 14:33

## 2025-02-06 RX ADMIN — SODIUM CHLORIDE, POTASSIUM CHLORIDE, SODIUM LACTATE AND CALCIUM CHLORIDE: 600; 310; 30; 20 INJECTION, SOLUTION INTRAVENOUS at 15:23

## 2025-02-06 RX ADMIN — PROCHLORPERAZINE EDISYLATE 5 MG: 5 INJECTION INTRAMUSCULAR; INTRAVENOUS at 15:38

## 2025-02-06 RX ADMIN — PROCHLORPERAZINE EDISYLATE 5 MG: 5 INJECTION INTRAMUSCULAR; INTRAVENOUS at 20:31

## 2025-02-06 RX ADMIN — LIDOCAINE HYDROCHLORIDE 40 ML: 20 SOLUTION ORAL at 13:26

## 2025-02-06 RX ADMIN — POTASSIUM CHLORIDE 10 MEQ: 7.46 INJECTION, SOLUTION INTRAVENOUS at 13:24

## 2025-02-06 RX ADMIN — HYDRALAZINE HYDROCHLORIDE 10 MG: 20 INJECTION, SOLUTION INTRAMUSCULAR; INTRAVENOUS at 18:58

## 2025-02-06 RX ADMIN — ZIPRASIDONE MESYLATE 20 MG: 20 INJECTION, POWDER, LYOPHILIZED, FOR SOLUTION INTRAMUSCULAR at 08:39

## 2025-02-06 RX ADMIN — ONDANSETRON 4 MG: 2 INJECTION INTRAMUSCULAR; INTRAVENOUS at 15:19

## 2025-02-06 RX ADMIN — POTASSIUM CHLORIDE 10 MEQ: 7.46 INJECTION, SOLUTION INTRAVENOUS at 18:18

## 2025-02-06 RX ADMIN — POTASSIUM CHLORIDE 10 MEQ: 7.46 INJECTION, SOLUTION INTRAVENOUS at 16:39

## 2025-02-06 RX ADMIN — ONDANSETRON 4 MG: 2 INJECTION, SOLUTION INTRAMUSCULAR; INTRAVENOUS at 08:39

## 2025-02-06 RX ADMIN — POTASSIUM CHLORIDE 10 MEQ: 7.46 INJECTION, SOLUTION INTRAVENOUS at 15:28

## 2025-02-06 RX ADMIN — ENOXAPARIN SODIUM 30 MG: 100 INJECTION SUBCUTANEOUS at 20:31

## 2025-02-06 RX ADMIN — FAMOTIDINE 20 MG: 20 TABLET, FILM COATED ORAL at 22:02

## 2025-02-06 RX ADMIN — POTASSIUM CHLORIDE 10 MEQ: 7.46 INJECTION, SOLUTION INTRAVENOUS at 22:12

## 2025-02-06 RX ADMIN — IOPAMIDOL 100 ML: 755 INJECTION, SOLUTION INTRAVENOUS at 11:59

## 2025-02-06 RX ADMIN — DIPHENHYDRAMINE HYDROCHLORIDE 50 MG: 50 INJECTION, SOLUTION INTRAMUSCULAR; INTRAVENOUS at 08:41

## 2025-02-06 NOTE — ED PROVIDER NOTES
[facility] EMERGENCY DEPARTMENT  EMERGENCY DEPARTMENT ENCOUNTER      Pt Name: Lilia Villegas  MRN: [id number]  Birthdate 1985  Date of evaluation: 2/6/2025  Provider: Clifton Rodriguez MD    CHIEF COMPLAINT       Chief Complaint   Patient presents with    Vomiting    Urinary Retention         HISTORY OF PRESENT ILLNESS   (Location/Symptom, Timing/Onset, Context/Setting, Quality, Duration, Modifying Factors, Severity)  Note limiting factors.   Patient is a 39-year-old female that presents to the [facility] emergency department for 4 days of intractable nausea and vomiting.  Patient states that she went to the [facility] ER on Monday of this week.  Was given Zofran at that time and sent home but continued to have significant nausea and vomiting.  Patient has not had any fevers.  She does complain of sweating and dry heaving.  Does not endorse any abdominal pain.  Has not been able to take her anxiety medications for the last 4 days.  Has not had any urine output in 2 days.  No constipation, diarrhea, blood in stool.  No blood in vomit.  No chest pain or shortness of breath.    Of note: Entire family has had the flu but with no nausea or vomiting symptoms.  Patient states that she does use marijuana every 2 weeks and has not used it recently.  No other substance abuse.            Review of External Medical Records:     Nursing Notes were reviewed.    REVIEW OF SYSTEMS    (2-9 systems for level 4, 10 or more for level 5)     Review of Systems    Except as noted above the remainder of the review of systems was reviewed and negative.       PAST MEDICAL HISTORY     Past Medical History:   Diagnosis Date    Anxiety     Depression     Panic attacks     PCOS (polycystic ovarian syndrome)          SURGICAL HISTORY       Past Surgical History:   Procedure Laterality Date    ORTHOPEDIC SURGERY      knee 3 total         CURRENT MEDICATIONS       Previous Medications    BUPROPION (WELLBUTRIN XL) 150 MG  within normal range or not returned as of this dictation.    EMERGENCY DEPARTMENT COURSE and DIFFERENTIAL DIAGNOSIS/MDM:   Vitals:    Vitals:    02/06/25 0749 02/06/25 0830 02/06/25 0900 02/06/25 1151   BP: 118/82 (!) 144/99 (!) 175/99 (!) 154/85   Pulse: 74   89   Resp: 20   20   Temp:       SpO2: 99% 100% 93% 98%           Medical Decision Making  Patient is a 39-year-old female that presents to the ED for 4 days of intractable nausea and vomiting.  Patient states that she has not urinated in 2 days.  Patient has been around her whole family who has the flu but none of them have symptoms such as this.  CT abdomen pelvis is significant for splenomegaly but no obstructions or other abnormalities.  White count is within normal limits.  Lactic acid elevated at 2.4 but this may be due to hypoperfusion secondary to intractable vomiting.  Trial of Geodon x 1 with significant improvement in nausea and vomiting.  Attempted repletion of potassium however was unsuccessful given recurrence of nausea and dry heaving.  Plan is to admit patient to workup etiology of patient's nausea and vomiting as source remains unknown and patient is unable to tolera adequate p.o. intake at this time.    Amount and/or Complexity of Data Reviewed  Labs: ordered. Decision-making details documented in ED Course.  Radiology: ordered.    Risk  Prescription drug management.  Decision regarding hospitalization.        Discussed patient and treatment plan with Rishi Hyman MD         REASSESSMENT     ED Course as of 02/06/25 1313   Thu Feb 06, 2025   0854 CBC grossly within normal limits. [EC]   1125 Potassium repletion initiated. Patient starting to tolerate PO fluids.    [EC]   1149 Lactic acid elevated at 2.4.  Normal white count.  Creatinine within normal limits.  Elevated lactic acid may be secondary to hypoperfusion from intractable nausea and vomiting.  Continue IV fluids bolus. [EC]   1159 Patient's vomiting has restarted.  Will be

## 2025-02-06 NOTE — PROGRESS NOTES
Enoxaparin 40 mg SQ Q24H adjusted to 30 mg SQ Q12H (weight 101-150 kg, CrCl > 30 mL/min) per protocol    Thank you,  Huang Lombardi, Pharm D, BCPS  [phone]

## 2025-02-06 NOTE — H&P
Stability: Not on file   Interpersonal Safety: Not on file   Utilities: Not on file        Medications were reconciled to the best of my ability given all available resources at the time of admission. Route is PO if not otherwise noted.     Family and social history were personally reviewed, all pertinent and relevant details are outlined as above.    Objective:   BP (!) 154/85   Pulse 89   Temp 97.5 °F (36.4 °C)   Resp 20   SpO2 98%         PHYSICAL EXAM:   General: Alert x oriented x 3, awake, no acute distress,   HEENT: PEERL, EOMI, moist mucus membranes  Neck: Supple, no JVD, no meningeal signs  Chest: Clear to auscultation bilaterally   CVS: RRR, S1 S2 heard, no murmurs/rubs/gallops  Abd: Soft, non-tender, non-distended, +bowel sounds   Ext: No clubbing, no cyanosis, no edema  Neuro/Psych: Pleasant mood and affect, CN 2-12 grossly intact, sensory grossly within normal limit, Strength 5/5 in all extremities, DTR 1+ x 4  Cap refill: Brisk, less than 3 seconds  Pulses: 2+, symmetric in all extremities  Skin: Warm, dry, without rashes or lesions    Data Review:   I have independently reviewed and interpreted patient's lab and all other diagnostic data    Abnormal Labs Reviewed   CBC WITH AUTO DIFFERENTIAL - Abnormal; Notable for the following components:       Result Value    Immature Granulocytes % 0.6 (*)     Immature Granulocytes Absolute 0.06 (*)     All other components within normal limits   COMPREHENSIVE METABOLIC PANEL - Abnormal; Notable for the following components:    Sodium 135 (*)     Potassium 2.8 (*)     CO2 20 (*)     Glucose 132 (*)     BUN/Creatinine Ratio 10 (*)     Total Bilirubin 1.6 (*)      (*)     AST 74 (*)     Alk Phosphatase 25 (*)     Albumin/Globulin Ratio 1.0 (*)     All other components within normal limits   LACTIC ACID - Abnormal; Notable for the following components:    Lactic Acid, Plasma 2.4 (*)     All other components within normal limits

## 2025-02-06 NOTE — ED TRIAGE NOTES
PT sts she has been throwing up for 4 days now.     Pt sts she hasn't been able to pee for 2 days now  Pt denies pain or trouble urinating prior to not being able to urinate  Pt denies abdominal pain     Pt denies fevers and diarrhea

## 2025-02-06 NOTE — PROGRESS NOTES
1659 Nurse made Dr. Arenas aware that patient's bp is 160/91 do you want to order anything prn?      1822 new order was given for hydralazine for systolic bp greater than 160.

## 2025-02-07 LAB
ALBUMIN SERPL-MCNC: 3.2 G/DL (ref 3.5–5)
ALBUMIN/GLOB SERPL: 1 (ref 1.1–2.2)
ALP SERPL-CCNC: 25 U/L (ref 45–117)
ALT SERPL-CCNC: 159 U/L (ref 12–78)
ANION GAP SERPL CALC-SCNC: 8 MMOL/L (ref 2–12)
AST SERPL-CCNC: 110 U/L (ref 15–37)
B PERT DNA SPEC QL NAA+PROBE: NOT DETECTED
BILIRUB SERPL-MCNC: 1.3 MG/DL (ref 0.2–1)
BORDETELLA PARAPERTUSSIS BY PCR: NOT DETECTED
BUN SERPL-MCNC: 7 MG/DL (ref 6–20)
BUN/CREAT SERPL: 10 (ref 12–20)
C PNEUM DNA SPEC QL NAA+PROBE: NOT DETECTED
CALCIUM SERPL-MCNC: 9 MG/DL (ref 8.5–10.1)
CHLORIDE SERPL-SCNC: 108 MMOL/L (ref 97–108)
CO2 SERPL-SCNC: 21 MMOL/L (ref 21–32)
CREAT SERPL-MCNC: 0.7 MG/DL (ref 0.55–1.02)
FLUAV SUBTYP SPEC NAA+PROBE: NOT DETECTED
FLUBV RNA SPEC QL NAA+PROBE: NOT DETECTED
GLOBULIN SER CALC-MCNC: 3.3 G/DL (ref 2–4)
GLUCOSE SERPL-MCNC: 102 MG/DL (ref 65–100)
HADV DNA SPEC QL NAA+PROBE: NOT DETECTED
HCOV 229E RNA SPEC QL NAA+PROBE: NOT DETECTED
HCOV HKU1 RNA SPEC QL NAA+PROBE: NOT DETECTED
HCOV NL63 RNA SPEC QL NAA+PROBE: NOT DETECTED
HCOV OC43 RNA SPEC QL NAA+PROBE: NOT DETECTED
HMPV RNA SPEC QL NAA+PROBE: NOT DETECTED
HPIV1 RNA SPEC QL NAA+PROBE: NOT DETECTED
HPIV2 RNA SPEC QL NAA+PROBE: NOT DETECTED
HPIV3 RNA SPEC QL NAA+PROBE: NOT DETECTED
HPIV4 RNA SPEC QL NAA+PROBE: NOT DETECTED
M PNEUMO DNA SPEC QL NAA+PROBE: NOT DETECTED
POTASSIUM SERPL-SCNC: 3.3 MMOL/L (ref 3.5–5.1)
PROT SERPL-MCNC: 6.5 G/DL (ref 6.4–8.2)
RSV RNA SPEC QL NAA+PROBE: NOT DETECTED
RV+EV RNA SPEC QL NAA+PROBE: NOT DETECTED
SARS-COV-2 RNA RESP QL NAA+PROBE: NOT DETECTED
SODIUM SERPL-SCNC: 137 MMOL/L (ref 136–145)

## 2025-02-07 PROCEDURE — 6370000000 HC RX 637 (ALT 250 FOR IP): Performed by: FAMILY MEDICINE

## 2025-02-07 PROCEDURE — 2500000003 HC RX 250 WO HCPCS: Performed by: FAMILY MEDICINE

## 2025-02-07 PROCEDURE — 6360000002 HC RX W HCPCS: Performed by: FAMILY MEDICINE

## 2025-02-07 PROCEDURE — 2580000003 HC RX 258: Performed by: FAMILY MEDICINE

## 2025-02-07 PROCEDURE — 80053 COMPREHEN METABOLIC PANEL: CPT

## 2025-02-07 PROCEDURE — 94761 N-INVAS EAR/PLS OXIMETRY MLT: CPT

## 2025-02-07 PROCEDURE — 1100000000 HC RM PRIVATE

## 2025-02-07 PROCEDURE — 36415 COLL VENOUS BLD VENIPUNCTURE: CPT

## 2025-02-07 PROCEDURE — 6370000000 HC RX 637 (ALT 250 FOR IP)

## 2025-02-07 PROCEDURE — 0202U NFCT DS 22 TRGT SARS-COV-2: CPT

## 2025-02-07 RX ORDER — LORAZEPAM 2 MG/ML
0.5 INJECTION INTRAMUSCULAR EVERY 6 HOURS PRN
Status: DISCONTINUED | OUTPATIENT
Start: 2025-02-07 | End: 2025-02-09 | Stop reason: HOSPADM

## 2025-02-07 RX ADMIN — ALPRAZOLAM 0.25 MG: 0.25 TABLET ORAL at 05:56

## 2025-02-07 RX ADMIN — LORAZEPAM 0.5 MG: 2 INJECTION INTRAMUSCULAR; INTRAVENOUS at 14:36

## 2025-02-07 RX ADMIN — PROCHLORPERAZINE EDISYLATE 5 MG: 5 INJECTION INTRAMUSCULAR; INTRAVENOUS at 10:48

## 2025-02-07 RX ADMIN — PROCHLORPERAZINE EDISYLATE 5 MG: 5 INJECTION INTRAMUSCULAR; INTRAVENOUS at 23:52

## 2025-02-07 RX ADMIN — ALPRAZOLAM 0.25 MG: 0.25 TABLET ORAL at 23:56

## 2025-02-07 RX ADMIN — POTASSIUM CHLORIDE 40 MEQ: 750 TABLET, EXTENDED RELEASE ORAL at 05:57

## 2025-02-07 RX ADMIN — SODIUM CHLORIDE, PRESERVATIVE FREE 10 ML: 5 INJECTION INTRAVENOUS at 20:34

## 2025-02-07 RX ADMIN — ENOXAPARIN SODIUM 30 MG: 100 INJECTION SUBCUTANEOUS at 20:34

## 2025-02-07 RX ADMIN — SODIUM CHLORIDE, POTASSIUM CHLORIDE, SODIUM LACTATE AND CALCIUM CHLORIDE: 600; 310; 30; 20 INJECTION, SOLUTION INTRAVENOUS at 17:16

## 2025-02-07 RX ADMIN — ENOXAPARIN SODIUM 30 MG: 100 INJECTION SUBCUTANEOUS at 07:37

## 2025-02-07 RX ADMIN — SODIUM CHLORIDE, POTASSIUM CHLORIDE, SODIUM LACTATE AND CALCIUM CHLORIDE: 600; 310; 30; 20 INJECTION, SOLUTION INTRAVENOUS at 04:09

## 2025-02-07 RX ADMIN — PROCHLORPERAZINE EDISYLATE 5 MG: 5 INJECTION INTRAMUSCULAR; INTRAVENOUS at 04:07

## 2025-02-07 RX ADMIN — ONDANSETRON 4 MG: 2 INJECTION INTRAMUSCULAR; INTRAVENOUS at 08:01

## 2025-02-07 RX ADMIN — PROCHLORPERAZINE EDISYLATE 5 MG: 5 INJECTION INTRAMUSCULAR; INTRAVENOUS at 16:45

## 2025-02-07 RX ADMIN — ONDANSETRON 4 MG: 2 INJECTION INTRAMUSCULAR; INTRAVENOUS at 14:07

## 2025-02-07 RX ADMIN — LORAZEPAM 0.5 MG: 2 INJECTION INTRAMUSCULAR; INTRAVENOUS at 08:37

## 2025-02-07 RX ADMIN — FAMOTIDINE 20 MG: 20 TABLET, FILM COATED ORAL at 20:34

## 2025-02-07 NOTE — PROGRESS NOTES
[facility]  Hospitalist Group                                                                                          Hospitalist Progress Note  Javier Arenas MD  Office Phone: [phone]        Date of Service:  2025  NAME:  Lilia Villegas  :  1985  MRN:  [id number]       Admission Summary:   Lilia Villegas is a 39 y.o. 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  Persistent nausea vomiting,likely viral  CT no acute finding, keep n.p.o., IV fluid, symptomatic treatment: No improvement consult GI  2.  Hypokalemia: Replace  3.  Lactic acidosis: Continue IV fluid monitor repeat  4.History of factor V Leyden carrier; has had follow-up with oncology   DIET: No diet orders on file   ISOLATION PRECAUTIONS: No active isolations  CODE STATUS: No Order   Central Line:     DVT PROPHYLAXIS: Lovenox  FUNCTIONAL STATUS PRIOR TO HOSPITALIZATION: Fully active and ambulatory; able to carry on all self-care without restriction.  Ambulatory status/function: By self   EARLY MOBILITY ASSESSMENT: Recommend routine ambulation while hospitalized with  of clinical lab test      I have personally and independently reviewed all pertinent labs, diagnostic studies, imaging, and have provided independent interpretation of the same.     Labs:     Recent Labs     02/06/25 0836   WBC 9.9   HGB 14.7   HCT 41.7        Recent Labs     02/06/25 0836 02/07/25 0410   * 137   K 2.8* 3.3*    108   CO2 20* 21   BUN 10 7   MG 1.7  --      Recent Labs     02/06/25 0836 02/07/25  0410   * 159*   GLOB 3.4 3.3     No results for input(s): \"INR\", \"APTT\" in the last 72 hours.    Invalid input(s): \"PTP\"   No results for input(s): \"TIBC\" in the last 72 hours.    Invalid input(s): \"FE\", \"PSAT\", \"FERR\"   No results found for: \"RBCF\"   No results for input(s): \"PH\", \"PCO2\", \"PO2\" in the last 72 hours.  No results for input(s): \"CPK\" in the last 72 hours.    Invalid input(s): \"CPKMB\", \"CKNDX\", \"TROIQ\"  No results found for: \"CHOL\", \"CHLST\", \"CHOLV\", \"HDL\", \"HDLC\", \"LDL\", \"LDLC\"  No results found for: \"GLUCPOC\"  [unfilled]    Notes reviewed from all clinical/nonclinical/nursing services involved in patient's clinical care. Care coordination discussions were held with appropriate clinical/nonclinical/ nursing providers based on care coordination needs.         Patients current active Medications were reviewed, considered, added and adjusted based on the clinical condition today.      Home Medications were reconciled to the best of my ability given all available resources at the time of admission. Route is PO if not otherwise noted.      Admission Status:[id number]:::1}      Medications Reviewed:     Current Facility-Administered Medications   Medication Dose Route Frequency    LORazepam (ATIVAN) injection 0.5 mg  0.5 mg IntraVENous Q6H PRN    potassium chloride (KLOR-CON) extended release tablet 40 mEq  40 mEq Oral Once    sodium chloride flush 0.9 % injection 5-40 mL  5-40 mL IntraVENous 2 times per day    sodium chloride flush 0.9 % injection 5-40 mL  5-40 mL

## 2025-02-07 NOTE — CONSULTS
Medina Miller PA-C                       [phone] office             Monday-Friday 8:00 am-4:30 pm  I am not permitted to use \"perfect serve\" use above for contact, thanks.      Gastroenterology Consultation Note      Admit Date: 2/6/2025  Consult Date: 2/7/2025   I greatly appreciate your asking me to see Lilia Villegas, thank you very much for the opportunity to participate in her care.    Narrative Assessment and Plan   39-year-old female being evaluated by GI for nausea and vomiting.  This started abruptly earlier this week and she has multiple family members sick at home with a flulike illness but did not nausea and vomiting.      Last bowel movement was 6 days ago.      CT abdomen pelvis with splenomegaly, gallbladder within normal limits, no CBD dilation, no dilation or wall thickening of the bowels.    Labs significant for mildly elevated LFTs including total bilirubin 1.3, , , alkaline phosphatase 25.  No fever or leukocytosis.    Impression:  Nausea and vomiting  Concern for acute viral illness  Mild LFT elevation without evidence of biliary obstruction    Plan:  Continue trending LFTs daily, INR added for a.m. labs tomorrow.  Check liver serologies for cause of elevated LFTs.  Viral workup including respiratory viral panel, Magda-Barr virus, CMV ordered.  Once able to tolerate, recommend bowel regimen with MiraLAX 17 g twice daily.  The patient has experienced the most relief from nausea and vomiting with Compazine and Benadryl.  These can be continued as needed.    Medina Miller PA-C    Subjective:     Chief Complaint: Nausea and vomiting    History of Present Illness: GI consultation requested by Dr. Arenas for evaluation of nausea and vomiting.  39-year-old female with past medical history of anxiety, depression, and panic attacks, presenting to ER with nausea and vomiting which started abruptly

## 2025-02-08 LAB
ANION GAP SERPL CALC-SCNC: 7 MMOL/L (ref 2–12)
BUN SERPL-MCNC: 7 MG/DL (ref 6–20)
BUN/CREAT SERPL: 8 (ref 12–20)
CALCIUM SERPL-MCNC: 9.2 MG/DL (ref 8.5–10.1)
CHLORIDE SERPL-SCNC: 106 MMOL/L (ref 97–108)
CO2 SERPL-SCNC: 25 MMOL/L (ref 21–32)
CREAT SERPL-MCNC: 0.86 MG/DL (ref 0.55–1.02)
FERRITIN SERPL-MCNC: 375 NG/ML (ref 8–252)
GLUCOSE SERPL-MCNC: 112 MG/DL (ref 65–100)
HCG SERPL QL: NEGATIVE
INR PPP: 1.1 (ref 0.9–1.1)
IRON SATN MFR SERPL: 20 % (ref 20–50)
IRON SERPL-MCNC: 55 UG/DL (ref 35–150)
MAGNESIUM SERPL-MCNC: 2.1 MG/DL (ref 1.6–2.4)
POTASSIUM SERPL-SCNC: 3.1 MMOL/L (ref 3.5–5.1)
PROTHROMBIN TIME: 11.2 SEC (ref 9.2–11.2)
SODIUM SERPL-SCNC: 138 MMOL/L (ref 136–145)
TIBC SERPL-MCNC: 271 UG/DL (ref 250–450)

## 2025-02-08 PROCEDURE — 82390 ASSAY OF CERULOPLASMIN: CPT

## 2025-02-08 PROCEDURE — 6370000000 HC RX 637 (ALT 250 FOR IP): Performed by: FAMILY MEDICINE

## 2025-02-08 PROCEDURE — 83550 IRON BINDING TEST: CPT

## 2025-02-08 PROCEDURE — 84703 CHORIONIC GONADOTROPIN ASSAY: CPT

## 2025-02-08 PROCEDURE — 6360000002 HC RX W HCPCS: Performed by: STUDENT IN AN ORGANIZED HEALTH CARE EDUCATION/TRAINING PROGRAM

## 2025-02-08 PROCEDURE — 83735 ASSAY OF MAGNESIUM: CPT

## 2025-02-08 PROCEDURE — 86664 EPSTEIN-BARR NUCLEAR ANTIGEN: CPT

## 2025-02-08 PROCEDURE — 82105 ALPHA-FETOPROTEIN SERUM: CPT

## 2025-02-08 PROCEDURE — 86038 ANTINUCLEAR ANTIBODIES: CPT

## 2025-02-08 PROCEDURE — 2500000003 HC RX 250 WO HCPCS: Performed by: FAMILY MEDICINE

## 2025-02-08 PROCEDURE — 86015 ACTIN ANTIBODY EACH: CPT

## 2025-02-08 PROCEDURE — 82728 ASSAY OF FERRITIN: CPT

## 2025-02-08 PROCEDURE — 76937 US GUIDE VASCULAR ACCESS: CPT

## 2025-02-08 PROCEDURE — 94761 N-INVAS EAR/PLS OXIMETRY MLT: CPT

## 2025-02-08 PROCEDURE — 80048 BASIC METABOLIC PNL TOTAL CA: CPT

## 2025-02-08 PROCEDURE — 86381 MITOCHONDRIAL ANTIBODY EACH: CPT

## 2025-02-08 PROCEDURE — 82103 ALPHA-1-ANTITRYPSIN TOTAL: CPT

## 2025-02-08 PROCEDURE — 6360000002 HC RX W HCPCS: Performed by: FAMILY MEDICINE

## 2025-02-08 PROCEDURE — 1100000000 HC RM PRIVATE

## 2025-02-08 PROCEDURE — 86665 EPSTEIN-BARR CAPSID VCA: CPT

## 2025-02-08 PROCEDURE — 85610 PROTHROMBIN TIME: CPT

## 2025-02-08 PROCEDURE — 2580000003 HC RX 258: Performed by: STUDENT IN AN ORGANIZED HEALTH CARE EDUCATION/TRAINING PROGRAM

## 2025-02-08 PROCEDURE — 83540 ASSAY OF IRON: CPT

## 2025-02-08 PROCEDURE — 80074 ACUTE HEPATITIS PANEL: CPT

## 2025-02-08 PROCEDURE — 6370000000 HC RX 637 (ALT 250 FOR IP)

## 2025-02-08 RX ORDER — POTASSIUM CHLORIDE 7.45 MG/ML
10 INJECTION INTRAVENOUS
Status: DISPENSED | OUTPATIENT
Start: 2025-02-08 | End: 2025-02-08

## 2025-02-08 RX ORDER — SODIUM CHLORIDE, SODIUM LACTATE, POTASSIUM CHLORIDE, CALCIUM CHLORIDE 600; 310; 30; 20 MG/100ML; MG/100ML; MG/100ML; MG/100ML
INJECTION, SOLUTION INTRAVENOUS CONTINUOUS
Status: DISPENSED | OUTPATIENT
Start: 2025-02-08 | End: 2025-02-09

## 2025-02-08 RX ORDER — POTASSIUM CHLORIDE 7.45 MG/ML
10 INJECTION INTRAVENOUS
Status: COMPLETED | OUTPATIENT
Start: 2025-02-08 | End: 2025-02-08

## 2025-02-08 RX ADMIN — POTASSIUM CHLORIDE 10 MEQ: 7.46 INJECTION, SOLUTION INTRAVENOUS at 16:43

## 2025-02-08 RX ADMIN — PROCHLORPERAZINE EDISYLATE 5 MG: 5 INJECTION INTRAMUSCULAR; INTRAVENOUS at 20:04

## 2025-02-08 RX ADMIN — PROCHLORPERAZINE EDISYLATE 5 MG: 5 INJECTION INTRAMUSCULAR; INTRAVENOUS at 06:00

## 2025-02-08 RX ADMIN — PROCHLORPERAZINE EDISYLATE 5 MG: 5 INJECTION INTRAMUSCULAR; INTRAVENOUS at 14:00

## 2025-02-08 RX ADMIN — SODIUM CHLORIDE, POTASSIUM CHLORIDE, SODIUM LACTATE AND CALCIUM CHLORIDE: 600; 310; 30; 20 INJECTION, SOLUTION INTRAVENOUS at 22:27

## 2025-02-08 RX ADMIN — ALPRAZOLAM 0.25 MG: 0.25 TABLET ORAL at 06:07

## 2025-02-08 RX ADMIN — FAMOTIDINE 20 MG: 20 TABLET, FILM COATED ORAL at 22:24

## 2025-02-08 RX ADMIN — POTASSIUM CHLORIDE 10 MEQ: 7.46 INJECTION, SOLUTION INTRAVENOUS at 18:18

## 2025-02-08 RX ADMIN — SODIUM CHLORIDE, PRESERVATIVE FREE 10 ML: 5 INJECTION INTRAVENOUS at 20:04

## 2025-02-08 RX ADMIN — POTASSIUM CHLORIDE 10 MEQ: 7.46 INJECTION, SOLUTION INTRAVENOUS at 14:25

## 2025-02-08 RX ADMIN — ONDANSETRON 4 MG: 4 TABLET, ORALLY DISINTEGRATING ORAL at 09:08

## 2025-02-08 RX ADMIN — POTASSIUM CHLORIDE 10 MEQ: 7.46 INJECTION, SOLUTION INTRAVENOUS at 12:21

## 2025-02-08 RX ADMIN — ACETAMINOPHEN 650 MG: 325 TABLET ORAL at 10:38

## 2025-02-08 RX ADMIN — ONDANSETRON 4 MG: 2 INJECTION INTRAMUSCULAR; INTRAVENOUS at 18:50

## 2025-02-08 RX ADMIN — POTASSIUM CHLORIDE 10 MEQ: 7.46 INJECTION, SOLUTION INTRAVENOUS at 10:40

## 2025-02-08 RX ADMIN — SODIUM CHLORIDE, PRESERVATIVE FREE 10 ML: 5 INJECTION INTRAVENOUS at 08:52

## 2025-02-08 RX ADMIN — ENOXAPARIN SODIUM 30 MG: 100 INJECTION SUBCUTANEOUS at 08:50

## 2025-02-08 RX ADMIN — LORAZEPAM 0.5 MG: 2 INJECTION INTRAMUSCULAR; INTRAVENOUS at 14:06

## 2025-02-08 RX ADMIN — SODIUM CHLORIDE, POTASSIUM CHLORIDE, SODIUM LACTATE AND CALCIUM CHLORIDE: 600; 310; 30; 20 INJECTION, SOLUTION INTRAVENOUS at 10:41

## 2025-02-08 RX ADMIN — ALPRAZOLAM 0.25 MG: 0.25 TABLET ORAL at 18:50

## 2025-02-08 RX ADMIN — ENOXAPARIN SODIUM 30 MG: 100 INJECTION SUBCUTANEOUS at 21:09

## 2025-02-08 RX ADMIN — POTASSIUM CHLORIDE 10 MEQ: 7.46 INJECTION, SOLUTION INTRAVENOUS at 21:11

## 2025-02-08 ASSESSMENT — PAIN DESCRIPTION - DESCRIPTORS: DESCRIPTORS: ACHING

## 2025-02-08 ASSESSMENT — PAIN DESCRIPTION - LOCATION: LOCATION: HEAD

## 2025-02-08 ASSESSMENT — PAIN SCALES - GENERAL: PAINLEVEL_OUTOF10: 5

## 2025-02-08 NOTE — PROGRESS NOTES
Pt unable to tolerate potassium at ordered rate despite new ultrasound guided IV. This RN reduced rate from 100 ml/hr to 50 ml/hr. Pt tolerating new rate well. No complaints of pain.

## 2025-02-08 NOTE — PROGRESS NOTES
Ultrasound IV by Irena Ashley RN :  Procedure Note    Ultrasound IV education provided to patient. Opportunities for questions given.     Ultrasound used for PIV placement:  20gauge 1.75in BD Nexiva  L forearm location.  1 X Attempt(s).    Flushed with ease; vigorous blood return.     Procedure tolerated well. Primary RN aware of IV placement and added to LDA.      Irena Ashley RN

## 2025-02-09 VITALS
OXYGEN SATURATION: 94 % | HEART RATE: 92 BPM | SYSTOLIC BLOOD PRESSURE: 127 MMHG | DIASTOLIC BLOOD PRESSURE: 80 MMHG | WEIGHT: 293 LBS | TEMPERATURE: 98.1 F | BODY MASS INDEX: 49.92 KG/M2 | RESPIRATION RATE: 17 BRPM

## 2025-02-09 LAB
-: NORMAL
A1AT SERPL-MCNC: 88 MG/DL (ref 101–187)
AFP-TM SERPL-MCNC: <1.8 NG/ML (ref 0–6.4)
ALBUMIN SERPL-MCNC: 3.5 G/DL (ref 3.5–5)
ALBUMIN/GLOB SERPL: 1.2 (ref 1.1–2.2)
ALP SERPL-CCNC: 32 U/L (ref 45–117)
ALT SERPL-CCNC: 251 U/L (ref 12–78)
ANION GAP SERPL CALC-SCNC: 10 MMOL/L (ref 2–12)
AST SERPL-CCNC: 60 U/L (ref 15–37)
BILIRUB SERPL-MCNC: 0.9 MG/DL (ref 0.2–1)
BUN SERPL-MCNC: 4 MG/DL (ref 6–20)
BUN/CREAT SERPL: 4 (ref 12–20)
CALCIUM SERPL-MCNC: 9.6 MG/DL (ref 8.5–10.1)
CERULOPLASMIN SERPL-MCNC: 27.3 MG/DL (ref 19–39)
CHLORIDE SERPL-SCNC: 103 MMOL/L (ref 97–108)
CO2 SERPL-SCNC: 25 MMOL/L (ref 21–32)
CREAT SERPL-MCNC: 0.89 MG/DL (ref 0.55–1.02)
GLOBULIN SER CALC-MCNC: 3 G/DL (ref 2–4)
GLUCOSE SERPL-MCNC: 108 MG/DL (ref 65–100)
HAV IGM SER QL: NONREACTIVE
HBV CORE IGM SER QL: NONREACTIVE
HBV SURFACE AG SER QL: <0.1 INDEX
HBV SURFACE AG SER QL: NEGATIVE
HCV AB SER IA-ACNC: 0.08 INDEX
HCV AB SERPL QL IA: NONREACTIVE
POTASSIUM SERPL-SCNC: 3.6 MMOL/L (ref 3.5–5.1)
PROT SERPL-MCNC: 6.5 G/DL (ref 6.4–8.2)
SODIUM SERPL-SCNC: 138 MMOL/L (ref 136–145)

## 2025-02-09 PROCEDURE — 94761 N-INVAS EAR/PLS OXIMETRY MLT: CPT

## 2025-02-09 PROCEDURE — 2500000003 HC RX 250 WO HCPCS: Performed by: FAMILY MEDICINE

## 2025-02-09 PROCEDURE — 6360000002 HC RX W HCPCS: Performed by: FAMILY MEDICINE

## 2025-02-09 PROCEDURE — 80053 COMPREHEN METABOLIC PANEL: CPT

## 2025-02-09 PROCEDURE — 36415 COLL VENOUS BLD VENIPUNCTURE: CPT

## 2025-02-09 RX ORDER — ONDANSETRON 4 MG/1
4 TABLET, FILM COATED ORAL 3 TIMES DAILY PRN
Qty: 15 TABLET | Refills: 0 | Status: SHIPPED | OUTPATIENT
Start: 2025-02-09

## 2025-02-09 RX ORDER — SODIUM CHLORIDE, SODIUM LACTATE, POTASSIUM CHLORIDE, CALCIUM CHLORIDE 600; 310; 30; 20 MG/100ML; MG/100ML; MG/100ML; MG/100ML
INJECTION, SOLUTION INTRAVENOUS CONTINUOUS
Status: DISCONTINUED | OUTPATIENT
Start: 2025-02-09 | End: 2025-02-09 | Stop reason: HOSPADM

## 2025-02-09 RX ADMIN — SODIUM CHLORIDE, PRESERVATIVE FREE 10 ML: 5 INJECTION INTRAVENOUS at 08:12

## 2025-02-09 RX ADMIN — ENOXAPARIN SODIUM 30 MG: 100 INJECTION SUBCUTANEOUS at 08:12

## 2025-02-09 NOTE — PROGRESS NOTES
[facility]                                                                                          Hospitalist Progress Note  Shyam Victor MD  Office Phone: [phone]        Date of Service:  2025  NAME:  Lilia Villegas  :  1985  MRN:  [id number]       Admission Summary:   Lilia Villegas is a 39 y.o. female who presents with nausea vomiting, urinary retention, nausea vomiting for 4 days, initially went to freestanding emergency room, given Zofran, symptoms persisted, no fever, but associated with dry heaving and sweating, no abdominal pain, unable to take her antianxiety medication, no urine output for 2 days, no diarrhea no constipation on review medical record shows multiple orthopedic visit for left knee pain, recently seen by hematologist for establishing care with hematologist for family history of hypercoagulable state, patient carrier of factor V Leyden, advised progesterone only pills, today potassium 2.8, lactic acid 2.4 BUN 10 creatinine 0.9, CBC unremarkable, CT shows splenomegaly, no acute abnormalities,        Interval history / Subjective:     Persistent nausea vomiting      Continuing to have n/v     Assessment & Plan:      Patient 39-year-old female presented with persistent nausea vomiting  1.  Persistent nausea vomiting,likely viral.  CT no acute finding, ADAT, IV fluid, symptomatic treatment: consult GI, undergoing liver workup. EGD is symptoms do not resolve  2.  Hypokalemia: Replace  3.  Lactic acidosis: Continue IV fluid.  4.History of factor V Leyden carrier; has had follow-up with oncology     DIET: No diet orders on file   ISOLATION PRECAUTIONS: No active isolations  CODE STATUS: No Order   Central Line:     DVT PROPHYLAXIS: Lovenox  FUNCTIONAL STATUS PRIOR TO HOSPITALIZATION: Fully active and ambulatory; able to carry on all self-care without restriction.  Ambulatory status/function: By self   EARLY MOBILITY  physiological,   Ext: no clubbing, no cyanosis, no edema, brisk 2+ DP pulses  Neuro/Psych: pleasant mood and affect, CN 2-12 grossly intact, sensory grossly within normal limit, Strength 5/5 in all extremities, DTR 1+ x 4  Skin: warm     Data Review:    Review and/or order of clinical lab test      I have personally and independently reviewed all pertinent labs, diagnostic studies, imaging, and have provided independent interpretation of the same.     Labs:     Recent Labs     02/06/25  0836   WBC 9.9   HGB 14.7   HCT 41.7        Recent Labs     02/06/25  0836 02/07/25  0410 02/08/25  0600   * 137 138   K 2.8* 3.3* 3.1*    108 106   CO2 20* 21 25   BUN 10 7 7   MG 1.7  --  2.1     Recent Labs     02/06/25  0836 02/07/25  0410   * 159*   GLOB 3.4 3.3     Recent Labs     02/08/25  0600   INR 1.1      Recent Labs     02/08/25  0600   TIBC 271      No results found for: \"RBCF\"   No results for input(s): \"PH\", \"PCO2\", \"PO2\" in the last 72 hours.  No results for input(s): \"CPK\" in the last 72 hours.    Invalid input(s): \"CPKMB\", \"CKNDX\", \"TROIQ\"  No results found for: \"CHOL\", \"CHLST\", \"CHOLV\", \"HDL\", \"HDLC\", \"LDL\", \"LDLC\"  No results found for: \"GLUCPOC\"  [unfilled]    Notes reviewed from all clinical/nonclinical/nursing services involved in patient's clinical care. Care coordination discussions were held with appropriate clinical/nonclinical/ nursing providers based on care coordination needs.         Patients current active Medications were reviewed, considered, added and adjusted based on the clinical condition today.      Home Medications were reconciled to the best of my ability given all available resources at the time of admission. Route is PO if not otherwise noted.      Admission Status:[id number]:::1}      Medications Reviewed:     Current Facility-Administered Medications   Medication Dose Route Frequency    lactated ringers infusion   IntraVENous Continuous    potassium chloride 10 mEq/100

## 2025-02-09 NOTE — PROGRESS NOTES
Nurse handed patient a copy of discharge instructions which have been read and explained to patient. New medications read and explained. Patient verbalized understanding. Patient aware that prescriptions have been electronically sent to pharmacy. Opportunity for questions and clarification offered. Removed patients IV access with no complications, VS stable, and patient sent with all belongings.

## 2025-02-09 NOTE — PLAN OF CARE
Problem: Discharge Planning  Goal: Discharge to home or other facility with appropriate resources  2/9/2025 1310 by Petrona Bower, RN  Outcome: HH/HSPC Resolved Met  2/9/2025 0302 by Dimple Vazquez, RN  Outcome: Progressing

## 2025-02-10 LAB
CMV DNA SERPL NAA+PROBE-ACNC: NEGATIVE IU/ML
CMV DNA SERPL NAA+PROBE-LOG IU: NORMAL LOG10 IU/ML
EBV INTERPRETATION: ABNORMAL
EBV NA IGG SER-ACNC: >600 U/ML (ref 0–17.9)
EBV VCA IGG SER-ACNC: 206 U/ML (ref 0–17.9)
EBV VCA IGM SER-ACNC: <36 U/ML (ref 0–35.9)
MITOCHONDRIA M2 IGG SER-ACNC: <20 UNITS (ref 0–20)
SMA IGG SER-ACNC: 2 UNITS (ref 0–19)

## 2025-02-10 NOTE — DISCHARGE SUMMARY
[facility]          [address] [place]  [address]                            DISCHARGE SUMMARY      PATIENT NAME: GONZALO LOCKWOOD          : 1985  MED REC NO: [id number]                       ROOM: A365  ACCOUNT NO: [id number]                       ADMIT DATE: 2025  PROVIDER: Teresa Katz MD    DISCHARGE DATE:  2025    ATTENDING PHYSICIAN:  TERESA KATZ    DIAGNOSIS ON ADMISSION:  Nausea.    DIAGNOSIS ON DISCHARGE:  Nausea.    HISTORY OF PRESENT ILLNESS:  The patient is a 39-year-old female, who has previous history significant for rheumatoid arthritis, presented for a few days history of nausea, which was associated with vomiting.  The patient has multiple family members who are sick with flu-like illness.  CT and abdomen and pelvis showed splenomegaly, gallbladder within normal limits.  No CBD dilatation.  Lab work was suggestive of slightly elevated LFTs including bilirubin of 1.3.  The patient has no fever, no leukocytosis.  The patient was treated symptomatically.  Seen by GI, who had no new recommendation.  Today, the patient says she had no vomiting or nausea since yesterday.  Her vitals are stable.  No labs are drawn.  She had a few loose bowel movements.  The patient is hemodynamically stable and will be discharged.    DISCHARGE MEDICATIONS:  Include:  1. P.r.n. Zofran.  2. Hold diclofenac.  3. Continue bupropion and Lexapro.    Follow up with PCP in couple of days to check LFTs.    CONDITION ON DISCHARGE:  Stable.    35 minutes spent on discharging this patient.        TERESA KATZ MD      SK/AQS  D:  2025 09:12:03  T:  2025 09:32:49  JOB #:  [id number]

## 2025-02-11 ENCOUNTER — APPOINTMENT (OUTPATIENT)
Facility: HOSPITAL | Age: 40
End: 2025-02-11
Payer: COMMERCIAL

## 2025-02-11 ENCOUNTER — HOSPITAL ENCOUNTER (EMERGENCY)
Facility: HOSPITAL | Age: 40
Discharge: HOME | End: 2025-02-12
Attending: EMERGENCY MEDICINE
Payer: COMMERCIAL

## 2025-02-11 VITALS
OXYGEN SATURATION: 97 % | SYSTOLIC BLOOD PRESSURE: 146 MMHG | WEIGHT: 293 LBS | TEMPERATURE: 97.5 F | DIASTOLIC BLOOD PRESSURE: 87 MMHG | HEART RATE: 91 BPM | BODY MASS INDEX: 48.82 KG/M2 | RESPIRATION RATE: 18 BRPM | HEIGHT: 65 IN

## 2025-02-11 DIAGNOSIS — R11.2 NAUSEA AND VOMITING, UNSPECIFIED VOMITING TYPE: Primary | ICD-10-CM

## 2025-02-11 LAB
ALBUMIN SERPL-MCNC: 3.6 G/DL (ref 3.5–5)
ALBUMIN/GLOB SERPL: 1.1 (ref 1.1–2.2)
ALP SERPL-CCNC: 30 U/L (ref 45–117)
ALT SERPL-CCNC: 215 U/L (ref 12–78)
ANION GAP SERPL CALC-SCNC: 11 MMOL/L (ref 2–12)
AST SERPL-CCNC: 101 U/L (ref 15–37)
BASOPHILS # BLD: 0.02 K/UL (ref 0–0.1)
BASOPHILS NFR BLD: 0.1 % (ref 0–1)
BILIRUB SERPL-MCNC: 1.2 MG/DL (ref 0.2–1)
BUN SERPL-MCNC: 9 MG/DL (ref 6–20)
BUN/CREAT SERPL: 9 (ref 12–20)
CALCIUM SERPL-MCNC: 9.3 MG/DL (ref 8.5–10.1)
CHLORIDE SERPL-SCNC: 111 MMOL/L (ref 97–108)
CO2 SERPL-SCNC: 16 MMOL/L (ref 21–32)
CREAT SERPL-MCNC: 1.03 MG/DL (ref 0.55–1.02)
DIFFERENTIAL METHOD BLD: ABNORMAL
EOSINOPHIL # BLD: 0.09 K/UL (ref 0–0.4)
EOSINOPHIL NFR BLD: 0.6 % (ref 0–7)
ERYTHROCYTE [DISTWIDTH] IN BLOOD BY AUTOMATED COUNT: 12.8 % (ref 11.5–14.5)
GLOBULIN SER CALC-MCNC: 3.4 G/DL (ref 2–4)
GLUCOSE SERPL-MCNC: 165 MG/DL (ref 65–100)
HCT VFR BLD AUTO: 43.5 % (ref 35–47)
HGB BLD-MCNC: 15.3 G/DL (ref 11.5–16)
IMM GRANULOCYTES # BLD AUTO: 0.15 K/UL (ref 0–0.04)
IMM GRANULOCYTES NFR BLD AUTO: 0.9 % (ref 0–0.5)
LYMPHOCYTES # BLD: 2.15 K/UL (ref 0.8–3.5)
LYMPHOCYTES NFR BLD: 13.4 % (ref 12–49)
MAGNESIUM SERPL-MCNC: 1.9 MG/DL (ref 1.6–2.4)
MCH RBC QN AUTO: 29.3 PG (ref 26–34)
MCHC RBC AUTO-ENTMCNC: 35.2 G/DL (ref 30–36.5)
MCV RBC AUTO: 83.2 FL (ref 80–99)
MONOCYTES # BLD: 0.97 K/UL (ref 0–1)
MONOCYTES NFR BLD: 6.1 % (ref 5–13)
NEUTS SEG # BLD: 12.64 K/UL (ref 1.8–8)
NEUTS SEG NFR BLD: 78.9 % (ref 32–75)
NRBC # BLD: 0 K/UL (ref 0–0.01)
NRBC BLD-RTO: 0 PER 100 WBC
PLATELET # BLD AUTO: 339 K/UL (ref 150–400)
PMV BLD AUTO: 10.9 FL (ref 8.9–12.9)
POTASSIUM SERPL-SCNC: 3.4 MMOL/L (ref 3.5–5.1)
PROT SERPL-MCNC: 7 G/DL (ref 6.4–8.2)
RBC # BLD AUTO: 5.23 M/UL (ref 3.8–5.2)
SODIUM SERPL-SCNC: 138 MMOL/L (ref 136–145)
WBC # BLD AUTO: 16 K/UL (ref 3.6–11)

## 2025-02-11 PROCEDURE — 74177 CT ABD & PELVIS W/CONTRAST: CPT

## 2025-02-11 PROCEDURE — 6360000004 HC RX CONTRAST MEDICATION: Performed by: EMERGENCY MEDICINE

## 2025-02-11 PROCEDURE — 2580000003 HC RX 258: Performed by: EMERGENCY MEDICINE

## 2025-02-11 PROCEDURE — 6360000002 HC RX W HCPCS: Performed by: EMERGENCY MEDICINE

## 2025-02-11 PROCEDURE — 96374 THER/PROPH/DIAG INJ IV PUSH: CPT

## 2025-02-11 PROCEDURE — 36415 COLL VENOUS BLD VENIPUNCTURE: CPT

## 2025-02-11 PROCEDURE — 83735 ASSAY OF MAGNESIUM: CPT

## 2025-02-11 PROCEDURE — 85025 COMPLETE CBC W/AUTO DIFF WBC: CPT

## 2025-02-11 PROCEDURE — 80053 COMPREHEN METABOLIC PANEL: CPT

## 2025-02-11 PROCEDURE — 99285 EMERGENCY DEPT VISIT HI MDM: CPT

## 2025-02-11 RX ORDER — 0.9 % SODIUM CHLORIDE 0.9 %
1000 INTRAVENOUS SOLUTION INTRAVENOUS ONCE
Status: COMPLETED | OUTPATIENT
Start: 2025-02-11 | End: 2025-02-12

## 2025-02-11 RX ORDER — HALOPERIDOL 5 MG/ML
2 INJECTION INTRAMUSCULAR ONCE
Status: DISCONTINUED | OUTPATIENT
Start: 2025-02-11 | End: 2025-02-12 | Stop reason: HOSPADM

## 2025-02-11 RX ORDER — DROPERIDOL 2.5 MG/ML
2.5 INJECTION, SOLUTION INTRAMUSCULAR; INTRAVENOUS ONCE
Status: COMPLETED | OUTPATIENT
Start: 2025-02-11 | End: 2025-02-11

## 2025-02-11 RX ORDER — IOPAMIDOL 755 MG/ML
100 INJECTION, SOLUTION INTRAVASCULAR
Status: COMPLETED | OUTPATIENT
Start: 2025-02-11 | End: 2025-02-11

## 2025-02-11 RX ADMIN — IOPAMIDOL 100 ML: 755 INJECTION, SOLUTION INTRAVENOUS at 23:55

## 2025-02-11 RX ADMIN — DROPERIDOL 2.5 MG: 2.5 INJECTION, SOLUTION INTRAMUSCULAR; INTRAVENOUS at 23:09

## 2025-02-11 RX ADMIN — SODIUM CHLORIDE 1000 ML: 9 INJECTION, SOLUTION INTRAVENOUS at 23:11

## 2025-02-11 ASSESSMENT — PAIN - FUNCTIONAL ASSESSMENT: PAIN_FUNCTIONAL_ASSESSMENT: 0-10

## 2025-02-11 ASSESSMENT — PAIN SCALES - GENERAL: PAINLEVEL_OUTOF10: 0

## 2025-02-12 LAB — ANA SER QL: NEGATIVE

## 2025-02-12 RX ORDER — PROMETHAZINE HYDROCHLORIDE 25 MG/1
25 SUPPOSITORY RECTAL EVERY 6 HOURS PRN
Qty: 20 SUPPOSITORY | Refills: 0 | Status: SHIPPED | OUTPATIENT
Start: 2025-02-12 | End: 2025-02-19

## 2025-02-12 RX ORDER — PROCHLORPERAZINE MALEATE 10 MG
10 TABLET ORAL EVERY 6 HOURS PRN
Qty: 120 TABLET | Refills: 0 | Status: SHIPPED | OUTPATIENT
Start: 2025-02-12

## 2025-02-12 NOTE — ED PROVIDER NOTES
[facility] EMERGENCY DEPARTMENT  EMERGENCY DEPARTMENT ENCOUNTER      Pt Name: Lilia Villegas  MRN: [id number]  Birthdate 1985  Date of evaluation: 2/11/2025  Provider: Tanner Giordano MD      HISTORY OF PRESENT ILLNESS      39-year-old female with recent admission to the hospital for intractable nausea and vomiting, also history of PCOS, depression, panic attacks presents to the emergency department chief complaint of intractable nausea and vomiting ever since discharge several days ago.  There is questionable THC abuse, she reports has been weeks since last use and she only used every couple of weeks.  Denies previous abdominal surgeries and denies any abdominal pain.  She reports she was unable to keep the Zofran down that she was sent home with.    Review of her medical record indicates some abnormal LFTs, suspected mononucleosis based on antibody screening.  She did not have LYDIA.  No fevers.  Influenza A has been in the house.  Sick contacts have recovered.              Nursing Notes were reviewed.    REVIEW OF SYSTEMS         Review of Systems        PAST MEDICAL HISTORY     Past Medical History:   Diagnosis Date    Anxiety     Depression     Panic attacks     PCOS (polycystic ovarian syndrome)          SURGICAL HISTORY       Past Surgical History:   Procedure Laterality Date    ORTHOPEDIC SURGERY      knee 3 total         CURRENT MEDICATIONS       Previous Medications    BUPROPION (WELLBUTRIN XL) 150 MG EXTENDED RELEASE TABLET    Take 1 tablet by mouth daily    ESCITALOPRAM (LEXAPRO) 20 MG TABLET    ceived the following from [facility]: Outside name: escitalopram oxalate (LEXAPRO) 20 mg tablet    MEDROXYPROGESTERONE (DEPO-PROVERA) 150 MG/ML INJECTION    INJECT 150 MG BY INTRAMUSCULAR ROUTE EVERY 3 MONTHS FOR 90 DAYS    ONDANSETRON (ZOFRAN) 4 MG TABLET    Take 1 tablet by mouth 3 times daily as needed for Nausea or Vomiting       ALLERGIES     Sulfa antibiotics

## 2025-02-12 NOTE — ED TRIAGE NOTES
Patient ambulatory to Triage with c/o vomiting and not being able to keep anything down    Patient states that she was seen here a few days ago and admitted for intractable nausea/vomiting    Patient also reports diarrhea, states that she tried Zofran with no relief of symptoms     Denies any abdominal pain

## 2025-02-13 ENCOUNTER — TELEPHONE (OUTPATIENT)
Age: 40
End: 2025-02-13

## 2025-02-13 NOTE — TELEPHONE ENCOUNTER
Pt called in requesting to cancel her appt. Pt stated she has Mono. Offered to reschedule the appt. Pt stated she will call back. Sheela

## 2025-02-21 NOTE — PROGRESS NOTES
Physician Progress Note      PATIENT:               GONZALO LOCKWOOD  [facility] #:                  [id number]  :                       1985  ADMIT DATE:       2025 7:31 AM  DISCH DATE:        2025 1:59 PM  RESPONDING  PROVIDER #:        Se Katz MD          QUERY TEXT:    Pt admitted with Persistent nausea and vomiting. Per H&P, symptoms noted to be   likely viral. If possible, please document in progress notes and discharge   summary the relationship, if any, between persistent nausea and vomiting and   viral infection.    The medical record reflects the following:  Risk Factors: C/o Persistent N/V  Clinical Indicators:  WBC: 9.9; Lac acid: 2.4, 1.3; ALT: 128; AST: 74; Bili:   1.6; Lipase: 40; Ceruloplasmin: 27.3; A-1 Antitrypsin: 88; Resp. Cx: Neg; CT   Abd/Pelvis: Splenomegaly. No other acute abnormality of the abdomen and pelvis     AP: \"Persistent nausea vomiting, likely viral \".     GI PN: \"Nausea and vomiting  Concern for acute viral illness  Mild LFT elevation without evidence of biliary obstruction\".     DC Summary:  \"DIAGNOSIS ON ADMISSION:  Nausea.  DIAGNOSIS ON DISCHARGE:  Nausea\".    Treatment: CT Abd/Pelvis; Labs; NPO; IVF; GI consult; Continue trending LFTs   daily, INR added; Viral workup including respiratory viral panel, Magda-Barr   virus, CMV ordered; Compazine and Benadryl;    Thank you,    [email or web address]  Options provided:  -- Persistent nausea and vomiting due to viral infection  -- Persistent nausea and vomiting unrelated to viral infection  -- Other - I will add my own diagnosis  -- Disagree - Not applicable / Not valid  -- Disagree - Clinically unable to determine / Unknown  -- Refer to Clinical Documentation Reviewer    PROVIDER RESPONSE TEXT:    Provider is clinically unable to determine a response to this query.  Viral cultures are not done    Query created by: Tabby Watts on 2025 3:12 PM      QUERY TEXT:    Patient admitted

## 2025-04-16 ENCOUNTER — TELEPHONE (OUTPATIENT)
Age: 40
End: 2025-04-16

## 2025-04-16 NOTE — TELEPHONE ENCOUNTER
Patient called and stated that she needed to reschedule appt due to her not having enough time off of work. Pt rescheduled to 5/21 at 8:45

## 2025-05-19 ENCOUNTER — HOSPITAL ENCOUNTER (OUTPATIENT)
Facility: HOSPITAL | Age: 40
Discharge: HOME | End: 2025-05-22

## 2025-05-19 DIAGNOSIS — D72.828 NEUTROPHILIA: ICD-10-CM

## 2025-05-19 LAB
BASOPHILS # BLD: 0.01 K/UL (ref 0–0.1)
BASOPHILS NFR BLD: 0.1 % (ref 0–1)
DIFFERENTIAL METHOD BLD: NORMAL
EOSINOPHIL # BLD: 0.27 K/UL (ref 0–0.4)
EOSINOPHIL NFR BLD: 3 % (ref 0–7)
ERYTHROCYTE [DISTWIDTH] IN BLOOD BY AUTOMATED COUNT: 12.3 % (ref 11.5–14.5)
HCT VFR BLD AUTO: 42.5 % (ref 35–47)
HGB BLD-MCNC: 14 G/DL (ref 11.5–16)
IMM GRANULOCYTES # BLD AUTO: 0.02 K/UL (ref 0–0.04)
IMM GRANULOCYTES NFR BLD AUTO: 0.2 % (ref 0–0.5)
LYMPHOCYTES # BLD: 1.65 K/UL (ref 0.8–3.5)
LYMPHOCYTES NFR BLD: 18.3 % (ref 12–49)
MCH RBC QN AUTO: 29.4 PG (ref 26–34)
MCHC RBC AUTO-ENTMCNC: 32.9 G/DL (ref 30–36.5)
MCV RBC AUTO: 89.3 FL (ref 80–99)
MONOCYTES # BLD: 0.48 K/UL (ref 0–1)
MONOCYTES NFR BLD: 5.3 % (ref 5–13)
NEUTS SEG # BLD: 6.58 K/UL (ref 1.8–8)
NEUTS SEG NFR BLD: 73.1 % (ref 32–75)
NRBC # BLD: 0 K/UL (ref 0–0.01)
NRBC BLD-RTO: 0 PER 100 WBC
PLATELET # BLD AUTO: 218 K/UL (ref 150–400)
PMV BLD AUTO: 11.6 FL (ref 8.9–12.9)
RBC # BLD AUTO: 4.76 M/UL (ref 3.8–5.2)
WBC # BLD AUTO: 9 K/UL (ref 3.6–11)

## 2025-05-21 ENCOUNTER — OFFICE VISIT (OUTPATIENT)
Age: 40
End: 2025-05-21
Payer: COMMERCIAL

## 2025-05-21 VITALS
HEIGHT: 65 IN | OXYGEN SATURATION: 95 % | WEIGHT: 293 LBS | DIASTOLIC BLOOD PRESSURE: 77 MMHG | TEMPERATURE: 98 F | SYSTOLIC BLOOD PRESSURE: 119 MMHG | BODY MASS INDEX: 48.82 KG/M2 | HEART RATE: 70 BPM | RESPIRATION RATE: 16 BRPM

## 2025-05-21 DIAGNOSIS — Z86.2 HISTORY OF LEUKOCYTOSIS: Primary | ICD-10-CM

## 2025-05-21 DIAGNOSIS — D68.51 FACTOR V LEIDEN CARRIER: ICD-10-CM

## 2025-05-21 DIAGNOSIS — Z82.3 FAMILY HISTORY OF STROKE: ICD-10-CM

## 2025-05-21 PROCEDURE — G8427 DOCREV CUR MEDS BY ELIG CLIN: HCPCS | Performed by: INTERNAL MEDICINE

## 2025-05-21 PROCEDURE — G8417 CALC BMI ABV UP PARAM F/U: HCPCS | Performed by: INTERNAL MEDICINE

## 2025-05-21 PROCEDURE — 99213 OFFICE O/P EST LOW 20 MIN: CPT | Performed by: INTERNAL MEDICINE

## 2025-05-21 PROCEDURE — 1036F TOBACCO NON-USER: CPT | Performed by: INTERNAL MEDICINE

## 2025-05-21 ASSESSMENT — PATIENT HEALTH QUESTIONNAIRE - PHQ9
SUM OF ALL RESPONSES TO PHQ QUESTIONS 1-9: 0
1. LITTLE INTEREST OR PLEASURE IN DOING THINGS: NOT AT ALL
SUM OF ALL RESPONSES TO PHQ QUESTIONS 1-9: 0
SUM OF ALL RESPONSES TO PHQ QUESTIONS 1-9: 0
2. FEELING DOWN, DEPRESSED OR HOPELESS: NOT AT ALL
SUM OF ALL RESPONSES TO PHQ QUESTIONS 1-9: 0

## 2025-05-21 NOTE — PROGRESS NOTES
[facility]  [address], [address] [facility] [address]  W: [phone]  F: [phone] Patient ID  Name: Lilia Villegas  YOB: 1985  MRN: [id number]  Referring Provider:   No referring provider defined for this encounter.  Primary Care Provider:   Albin Vigil MD       HEMATOLOGY/MEDICAL ONCOLOGY  NOTE     Reason for Evaluation:     Chief Complaint   Patient presents with    Follow-up     Subjective:     History of Present Illness:   Date of Visit: 5/21/2025  Lilia Villegas is a 40 y.o. female who presents for a follow-up evaluation for family history of hypercoagulability.  Also, followed with us for an elevated WBC. She relays some mild itching, skin rash, numbness, swelling.   History of Present Illness  The patient presents for evaluation of elevated WBC count, family history of clotting, weight management, and rheumatoid arthritis.    Elevated WBC count  - Hospitalized in 02/2025 for mononucleosis, followed by COVID-19 infection.  - Reports feeling well overall.    Family history of clotting  - No clotting episodes.  - Consulted vein specialist who ordered tests; follow-up next week.  - Positive initial assessment considering family history.    Weight management  - Desires weight loss but insurance does not cover interventions.  - No dietary counseling sought.  - Fond of chocolate cookies but increasing physical activity, especially walking, for upcoming trip to [place] in 09/2025.    Rheumatoid arthritis  - Typically feels well in the mornings and throughout the day.    Supplemental information: None.    FAMILY HISTORY  They have a family history of clotting disorders.      -----  Past Medical History:   Diagnosis Date    Anxiety     Depression     MRSA (methicillin resistant Staphylococcus aureus)     Panic attacks     PCOS (polycystic ovarian syndrome)       Past Surgical History:   Procedure Laterality Date    ORTHOPEDIC 
Lilia Villegas is a 40 y.o. female follow up for         1. Have you been to the ER, urgent care clinic since your last visit?  Hospitalized since your last visit? Yes [facility] 2/2025    2. Have you seen or consulted any other health care providers outside of the [facility] since your last visit?  Include any pap smears or colon screening. No  
15

## 2025-05-27 PROBLEM — D72.828 NEUTROPHILIA: Status: ACTIVE | Noted: 2025-05-27
